# Patient Record
Sex: FEMALE | Race: WHITE | NOT HISPANIC OR LATINO | Employment: FULL TIME | URBAN - METROPOLITAN AREA
[De-identification: names, ages, dates, MRNs, and addresses within clinical notes are randomized per-mention and may not be internally consistent; named-entity substitution may affect disease eponyms.]

---

## 2023-10-05 ENCOUNTER — OFFICE VISIT (OUTPATIENT)
Dept: PRIMARY CARE | Facility: CLINIC | Age: 38
End: 2023-10-05
Payer: COMMERCIAL

## 2023-10-05 VITALS
WEIGHT: 158 LBS | OXYGEN SATURATION: 97 % | BODY MASS INDEX: 27.99 KG/M2 | DIASTOLIC BLOOD PRESSURE: 70 MMHG | SYSTOLIC BLOOD PRESSURE: 130 MMHG | TEMPERATURE: 97.1 F | HEART RATE: 67 BPM

## 2023-10-05 DIAGNOSIS — R53.83 OTHER FATIGUE: Primary | ICD-10-CM

## 2023-10-05 DIAGNOSIS — M25.552 LEFT HIP PAIN: ICD-10-CM

## 2023-10-05 PROCEDURE — 99213 OFFICE O/P EST LOW 20 MIN: CPT | Performed by: FAMILY MEDICINE

## 2023-10-05 PROCEDURE — 1036F TOBACCO NON-USER: CPT | Performed by: FAMILY MEDICINE

## 2023-10-05 ASSESSMENT — ENCOUNTER SYMPTOMS
ARTHRALGIAS: 1
ABDOMINAL PAIN: 0
FATIGUE: 1
SHORTNESS OF BREATH: 0

## 2023-10-05 NOTE — PROGRESS NOTES
Assessment/Plan   ASSESSMENT/PLAN:      Linda was seen today for disacuss post partum.  Diagnoses and all orders for this visit:  Other fatigue (Primary)  -     Tsh With Reflex To Free T4 If Abnormal; Future  -     CBC; Future  -     Lipid panel; Future  -     Comprehensive metabolic panel; Future  -     Vitamin D 25-Hydroxy,Total (for eval of Vitamin D levels); Future  -     Vitamin B12; Future  -     Hemoglobin A1c; Future  Left hip pain  -     XR hip left 2 or 3 views; Future  -     Referral to Physical Therapy; Future       Patient Instructions   Fatigue: will order screening labs   Hip pain: xray ordered, although fracture unlikely will rule out arthritis. Overall, likely musculoskeletal, provided hip stretches and PT referral     Paul Khalil DO     FUTURE DIRECTION:     Subjective   SUBJECTIVE:     Patient ID: Linda Montenegro is a 38 y.o. femalefor the following:    Joint pain   - located in hips and low back pain   - left worse than right, feels stiffness with internal rotation   - descrbed as achy, radiating down to knee   - states that she is 9 month postpartum for  (currently nursing) and felt that back pain on left side was worse during delivery     Fatigue   - Feels like she has not been able to get into a sleep routine between work and taking care of son   - She denies insomnia and mentions increased caffeine use   - Pt also mentions hair shedding which she attributes to recent pregnancy        Review of Systems   Constitutional:  Positive for fatigue.   Respiratory:  Negative for shortness of breath.    Cardiovascular:  Negative for chest pain.   Gastrointestinal:  Negative for abdominal pain.   Musculoskeletal:  Positive for arthralgias.       No Known Allergies    No current outpatient medications on file.     There is no problem list on file for this patient.      Social History     Socioeconomic History    Marital status:      Spouse name: Not on file    Number of children:  Not on file    Years of education: Not on file    Highest education level: Not on file   Occupational History    Not on file   Tobacco Use    Smoking status: Never    Smokeless tobacco: Never   Substance and Sexual Activity    Alcohol use: Not Currently     Comment: occasionally    Drug use: Never    Sexual activity: Not on file   Other Topics Concern    Not on file   Social History Narrative    Not on file     Social Determinants of Health     Financial Resource Strain: Not on file   Food Insecurity: Not on file   Transportation Needs: Not on file   Physical Activity: Not on file   Stress: Not on file   Social Connections: Not on file   Intimate Partner Violence: Not on file   Housing Stability: Not on file       Objective   OBJECTIVE:     Vitals:    10/05/23 1408   BP: 130/70   Pulse: 67   Temp: 36.2 °C (97.1 °F)   SpO2: 97%       Exam      Physical Exam  Constitutional:       Appearance: Normal appearance.   HENT:      Head: Normocephalic.   Cardiovascular:      Rate and Rhythm: Normal rate and regular rhythm.   Pulmonary:      Effort: Pulmonary effort is normal.      Breath sounds: No wheezing.   Musculoskeletal:      Cervical back: Normal range of motion.      Comments: Decreased ROM  with left internal rotation, - FALLON and -FADIR   Neurological:      Mental Status: She is alert.   Psychiatric:         Mood and Affect: Mood normal.

## 2023-10-05 NOTE — PATIENT INSTRUCTIONS
Fatigue: will order screening labs   Hip pain: xray ordered, although fracture unlikely will rule out arthritis. Overall, likely musculoskeletal, provided hip stretches and PT referral

## 2023-11-22 ENCOUNTER — LAB (OUTPATIENT)
Dept: LAB | Facility: LAB | Age: 38
End: 2023-11-22
Payer: COMMERCIAL

## 2023-11-22 ENCOUNTER — ANCILLARY PROCEDURE (OUTPATIENT)
Dept: RADIOLOGY | Facility: CLINIC | Age: 38
End: 2023-11-22
Payer: COMMERCIAL

## 2023-11-22 DIAGNOSIS — M25.552 LEFT HIP PAIN: ICD-10-CM

## 2023-11-22 DIAGNOSIS — R53.83 OTHER FATIGUE: ICD-10-CM

## 2023-11-22 LAB
25(OH)D3 SERPL-MCNC: 20 NG/ML (ref 30–100)
ALBUMIN SERPL BCP-MCNC: 4.6 G/DL (ref 3.4–5)
ALP SERPL-CCNC: 94 U/L (ref 33–110)
ALT SERPL W P-5'-P-CCNC: 16 U/L (ref 7–45)
ANION GAP SERPL CALC-SCNC: 10 MMOL/L (ref 10–20)
AST SERPL W P-5'-P-CCNC: 17 U/L (ref 9–39)
BILIRUB SERPL-MCNC: 0.4 MG/DL (ref 0–1.2)
BUN SERPL-MCNC: 16 MG/DL (ref 6–23)
CALCIUM SERPL-MCNC: 9.2 MG/DL (ref 8.6–10.3)
CHLORIDE SERPL-SCNC: 104 MMOL/L (ref 98–107)
CHOLEST SERPL-MCNC: 186 MG/DL (ref 0–199)
CHOLESTEROL/HDL RATIO: 2.8
CO2 SERPL-SCNC: 27 MMOL/L (ref 21–32)
CREAT SERPL-MCNC: 0.69 MG/DL (ref 0.5–1.05)
ERYTHROCYTE [DISTWIDTH] IN BLOOD BY AUTOMATED COUNT: 12.7 % (ref 11.5–14.5)
EST. AVERAGE GLUCOSE BLD GHB EST-MCNC: 111 MG/DL
GFR SERPL CREATININE-BSD FRML MDRD: >90 ML/MIN/1.73M*2
GLUCOSE SERPL-MCNC: 87 MG/DL (ref 74–99)
HBA1C MFR BLD: 5.5 %
HCT VFR BLD AUTO: 45.6 % (ref 36–46)
HDLC SERPL-MCNC: 66.6 MG/DL
HGB BLD-MCNC: 14.6 G/DL (ref 12–16)
LDLC SERPL CALC-MCNC: 110 MG/DL
MCH RBC QN AUTO: 28.1 PG (ref 26–34)
MCHC RBC AUTO-ENTMCNC: 32 G/DL (ref 32–36)
MCV RBC AUTO: 88 FL (ref 80–100)
NON HDL CHOLESTEROL: 119 MG/DL (ref 0–149)
NRBC BLD-RTO: 0 /100 WBCS (ref 0–0)
PLATELET # BLD AUTO: 226 X10*3/UL (ref 150–450)
POTASSIUM SERPL-SCNC: 4.2 MMOL/L (ref 3.5–5.3)
PROT SERPL-MCNC: 6.9 G/DL (ref 6.4–8.2)
RBC # BLD AUTO: 5.2 X10*6/UL (ref 4–5.2)
SODIUM SERPL-SCNC: 137 MMOL/L (ref 136–145)
TRIGL SERPL-MCNC: 49 MG/DL (ref 0–149)
TSH SERPL-ACNC: 1.2 MIU/L (ref 0.44–3.98)
VIT B12 SERPL-MCNC: 218 PG/ML (ref 211–911)
VLDL: 10 MG/DL (ref 0–40)
WBC # BLD AUTO: 6.2 X10*3/UL (ref 4.4–11.3)

## 2023-11-22 PROCEDURE — 80061 LIPID PANEL: CPT

## 2023-11-22 PROCEDURE — 84443 ASSAY THYROID STIM HORMONE: CPT

## 2023-11-22 PROCEDURE — 36415 COLL VENOUS BLD VENIPUNCTURE: CPT

## 2023-11-22 PROCEDURE — 73502 X-RAY EXAM HIP UNI 2-3 VIEWS: CPT | Mod: LT

## 2023-11-22 PROCEDURE — 73502 X-RAY EXAM HIP UNI 2-3 VIEWS: CPT | Mod: LEFT SIDE | Performed by: RADIOLOGY

## 2023-11-22 PROCEDURE — 80053 COMPREHEN METABOLIC PANEL: CPT

## 2023-11-22 PROCEDURE — 82306 VITAMIN D 25 HYDROXY: CPT

## 2023-11-22 PROCEDURE — 82607 VITAMIN B-12: CPT

## 2023-11-22 PROCEDURE — 83036 HEMOGLOBIN GLYCOSYLATED A1C: CPT

## 2023-11-22 PROCEDURE — 85027 COMPLETE CBC AUTOMATED: CPT

## 2023-11-28 ENCOUNTER — TELEPHONE (OUTPATIENT)
Dept: PRIMARY CARE | Facility: CLINIC | Age: 38
End: 2023-11-28
Payer: COMMERCIAL

## 2023-11-28 NOTE — TELEPHONE ENCOUNTER
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 Continue with PT

## 2023-11-29 ENCOUNTER — PATIENT MESSAGE (OUTPATIENT)
Dept: PRIMARY CARE | Facility: CLINIC | Age: 38
End: 2023-11-29
Payer: COMMERCIAL

## 2023-11-29 DIAGNOSIS — M62.89 PELVIC FLOOR DYSFUNCTION: Primary | ICD-10-CM

## 2023-12-28 ENCOUNTER — EVALUATION (OUTPATIENT)
Dept: PHYSICAL THERAPY | Facility: CLINIC | Age: 38
End: 2023-12-28
Payer: COMMERCIAL

## 2023-12-28 DIAGNOSIS — M25.552 LEFT HIP PAIN: Primary | ICD-10-CM

## 2023-12-28 PROCEDURE — 97110 THERAPEUTIC EXERCISES: CPT | Mod: GP

## 2023-12-28 PROCEDURE — 97161 PT EVAL LOW COMPLEX 20 MIN: CPT | Mod: GP

## 2023-12-28 ASSESSMENT — ENCOUNTER SYMPTOMS
OCCASIONAL FEELINGS OF UNSTEADINESS: 0
LOSS OF SENSATION IN FEET: 0
DEPRESSION: 0

## 2023-12-28 ASSESSMENT — PATIENT HEALTH QUESTIONNAIRE - PHQ9
SUM OF ALL RESPONSES TO PHQ9 QUESTIONS 1 AND 2: 0
2. FEELING DOWN, DEPRESSED OR HOPELESS: NOT AT ALL
1. LITTLE INTEREST OR PLEASURE IN DOING THINGS: NOT AT ALL

## 2023-12-28 ASSESSMENT — PAIN - FUNCTIONAL ASSESSMENT: PAIN_FUNCTIONAL_ASSESSMENT: 0-10

## 2023-12-28 ASSESSMENT — PAIN SCALES - GENERAL: PAINLEVEL_OUTOF10: 0 - NO PAIN

## 2023-12-28 NOTE — PROGRESS NOTES
Physical Therapy    Physical Therapy Evaluation and Treatment      Patient Name: Linda Montenegro  MRN: [id number]  Today's Date: 12/28/2023  Time Calculation  Start Time: 0730  Stop Time: 0825  Time Calculation (min): 55 min    Assessment:  PT Assessment  Rehab Prognosis: Excellent   The patient is a 38 year old female presenting to PT with c/o LBP, L hip tightness, & L hip pain.  The patient will benefit from skilled intervention to address above complaints & to increase L LE strength for return to previous activity level.      Plan:  OP PT Plan  Treatment/Interventions: Cryotherapy, Education/ Instruction, Hot pack, Therapeutic exercises  PT Plan: Skilled PT  PT Frequency: 2 times per week  Duration: 3 weeks  Rehab Potential: Excellent  Plan of Care Agreement: Patient    Current Problem:   1. Left hip pain  Referral to Physical Therapy    Follow Up In Physical Therapy          Subjective    General:  General  Reason for Referral: L hip pain  Referred By: Remi PARKER  Past Medical History Relevant to Rehab: Reviewed  The patient reports she has been experiencing LBP/L hip pain for the past year after childbirth.  Sitting for an extended period of time & getting up from the floor exacerbates pain.  Pain ranges from 0-5/10.  The patient's goals are to run again & get up from the floor without pain.      Precautions:  Precautions  STEADI Fall Risk Score (The score of 4 or more indicates an increased risk of falling): 0  Precautions Comment: none     Pain:  Pain Assessment  Pain Assessment: 0-10  Pain Score: 0 - No pain    Prior Level of Function:  Prior Function Per Pt/Caregiver Report  Level of [place]: Independent with ADLs and functional transfers    Objective   L Hip AROM (degrees)  Flexion = 95   Abduction = 50  Extension = 34    L LE strength  Hip Flexion = 4+/5  Hip Abduction = 4/5  Hip Extension = 4/5  Quads = 4+/5  HS = 4+/5    L HS flexibility = 75 degrees     Outcome Measures:  Other Measures  Lower  "Extremity Funtional Score (LEFS): 64     Treatments:  Seated HS stretch - x3 30\"H  Kneeling Hip Flexor stretch - x3 30\"H  Standing Hip Adductor stretch - x3 30\"H  Supine PPT - x10 5\"H  SL Bridge - x5  SL Hip Abduction - x10 5\"H  HP low back & L hip    EDUCATION:   HEP    Goals:  1) Decrease L hip pain & LBP with all activities <1/10    2) Increase L LE strength to 5/5 to prepare for return to running           "

## 2024-01-04 ENCOUNTER — TREATMENT (OUTPATIENT)
Dept: PHYSICAL THERAPY | Facility: CLINIC | Age: 39
End: 2024-01-04
Payer: COMMERCIAL

## 2024-01-04 DIAGNOSIS — M25.552 LEFT HIP PAIN: ICD-10-CM

## 2024-01-04 PROCEDURE — 97110 THERAPEUTIC EXERCISES: CPT | Mod: GP

## 2024-01-04 ASSESSMENT — PAIN - FUNCTIONAL ASSESSMENT: PAIN_FUNCTIONAL_ASSESSMENT: 0-10

## 2024-01-04 ASSESSMENT — PAIN SCALES - GENERAL: PAINLEVEL_OUTOF10: 0 - NO PAIN

## 2024-01-04 NOTE — PROGRESS NOTES
"Physical Therapy    Physical Therapy Treatment      Patient Name: Linda Montenegro  MRN: [id number]  Today's Date: 1/4/2024  Time Calculation  Start Time: 0815  Stop Time: 0910  Time Calculation (min): 55 min    Assessment:    The patient reports experiencing muscle soreness & fatigue following treatment but no significant low back or hip pain.    Plan: Continue with POC to increase trunk strength & hip strength for return to previous activity level.       Current Problem:   1. Left hip pain  Follow Up In Physical Therapy          Subjective    General:   The patient reports she is pain free this morning.    Precautions:  Precautions  Precautions Comment: none     Pain:  Pain Assessment  Pain Assessment: 0-10  Pain Score: 0 - No pain     Objective   L LE strength  Hip Flexion = 4+/5  Hip Abduction = 4/5  Hip Extension = 4/5  Quads = 4+/5  HS = 4+/5      Treatments:  EXERCISES Date 1/4/24 Date Date Date    REPS REPS REPS REPS          Nustep L5 5'             Shuttle   DLP       Shuttle   SLP              Shuttle   TR/HR                     Tband   Lat Pulls Blue x 20      Tband   Skis Blue x 20      Tband   Mid Row Blue x 20      Tband   Mower starts              Back Extension 60# 3x10      SB   LTR              4 Way Hip Flexion 30# 2x10      4 Way Hip Abduction 30# 2x10      4 Way Hip Extension  30# 2x10             Seated Swiss Ball Alt Hip Flex  X10 ea 5\" H      Swiss Ball seated 3 way stretch  X5 ea 5\" H      PPT with marches              HP L hip 10'      Stretches              Ab Curls 50# 3x10                   Goals:  1) Decrease L hip pain & LBP with all activities <1/10 -progressing    2) Increase L LE strength to 5/5 to prepare for return to running         "

## 2024-01-08 ENCOUNTER — TREATMENT (OUTPATIENT)
Dept: PHYSICAL THERAPY | Facility: CLINIC | Age: 39
End: 2024-01-08
Payer: COMMERCIAL

## 2024-01-08 DIAGNOSIS — M25.552 LEFT HIP PAIN: ICD-10-CM

## 2024-01-08 PROCEDURE — 97110 THERAPEUTIC EXERCISES: CPT | Mod: GP

## 2024-01-08 ASSESSMENT — PAIN - FUNCTIONAL ASSESSMENT: PAIN_FUNCTIONAL_ASSESSMENT: 0-10

## 2024-01-08 ASSESSMENT — PAIN SCALES - GENERAL: PAINLEVEL_OUTOF10: 0 - NO PAIN

## 2024-01-08 NOTE — PROGRESS NOTES
"Physical Therapy    Physical Therapy Treatment      Patient Name: Linda Montenegro  MRN: [id number]  Today's Date: 1/8/2024  Time In: 1745  Time Out: 1835  Total Time: 50 minutes     Assessment:    The patient reports experiencing muscle fatigue & soreness following treatment but no increase in pain.      Plan: Continue with POC to increase trunk strength & hip strength for return to previous activity level.       Current Problem:   1. Left hip pain  Follow Up In Physical Therapy          Subjective    General:   The patient reports she is pain free this evening.    Precautions:   None     Pain:   0/10     Objective   L LE strength  Hip Flexion = 4+/5      Treatments:  EXERCISES Date 1/4/24 Date 1/8/24 Date Date    REPS REPS REPS REPS          Nustep L5 5'             Shuttle   DLP       Shuttle   SLP              Shuttle   TR/HR       Birddogs  X10 ea 5\"H            Tband   Lat Pulls Blue x 20 Blue x20     Tband   Skis Blue x 20 Blue x20     Tband   Mid Row Blue x 20 Blue x20     Tband   Mower starts              Back Extension 60# 3x10 60# 3x15     SB   LTR              4 Way Hip Flexion 30# 2x10 30# 2x10     4 Way Hip Abduction 30# 2x10 30# 2x10     4 Way Hip Extension  30# 2x10 30# 2x10            Seated Swiss Ball Alt Hip Flex  X10 ea 5\" H X10 ea 5\"H     Swiss Ball seated 3 way stretch  X5 ea 5\" H X5 ea 5\"H     PPT with marches              HP L hip 10' 10'     Stretches              Ab Curls 50# 3x10 60# 3x15     Band Walks   Stuart 20' 1ea           Goals:  1) Decrease L hip pain & LBP with all activities <1/10 -progressing    2) Increase L LE strength to 5/5 to prepare for return to running       "

## 2024-01-11 ENCOUNTER — TREATMENT (OUTPATIENT)
Dept: PHYSICAL THERAPY | Facility: CLINIC | Age: 39
End: 2024-01-11
Payer: COMMERCIAL

## 2024-01-11 DIAGNOSIS — M25.552 LEFT HIP PAIN: Primary | ICD-10-CM

## 2024-01-11 PROCEDURE — 97110 THERAPEUTIC EXERCISES: CPT | Mod: GP

## 2024-01-11 ASSESSMENT — PAIN SCALES - GENERAL: PAINLEVEL_OUTOF10: 1

## 2024-01-11 ASSESSMENT — PAIN - FUNCTIONAL ASSESSMENT: PAIN_FUNCTIONAL_ASSESSMENT: 0-10

## 2024-01-11 NOTE — PROGRESS NOTES
"Physical Therapy    Physical Therapy Treatment      Patient Name: Linda Montenegro  MRN: [id number]  Today's Date: 1/8/2024  Time Calculation  Start Time: 0730  Stop Time: 0825  Time Calculation (min): 55 min  Visit #4    Assessment:    The patient reports experiencing L lateral hip/quad \"popping\" with resisted L hip abduction.      Plan: Continue with POC to increase trunk strength & hip strength for return to previous activity level.       Current Problem:   1. Left hip pain  Follow Up In Physical Therapy          Subjective    General:   The patient reports experiencing peak muscle soreness 2 days following last treatment.      Precautions:  Precautions  Precautions Comment: none     Pain:  Pain Assessment  Pain Assessment: 0-10  Pain Score: 1  Pain Location: Hip  Pain Orientation: Left     Objective   L LE strength  Hip Flexion = 4+/5      Treatments:  EXERCISES Date 1/4/24 Date 1/8/24 Date 1/11/24 Date    REPS REPS REPS REPS          Nustep L5 5'  L5 5'           Shuttle   DLP       Shuttle   SLP              Shuttle   TR/HR       Birddogs  X10 ea 5\"H X10 ea 5\"H           Tband   Lat Pulls Blue x 20 Blue x20 Blue x20    Tband   Skis Blue x 20 Blue x20 Blue x20    Tband   Mid Row Blue x 20 Blue x20 Blue x20    Tband   Mower starts              Back Extension 60# 3x10 60# 3x15 60# 3x15    SB   LTR              4 Way Hip Flexion 30# 2x10 30# 2x10 30# 2x10    4 Way Hip Abduction 30# 2x10 30# 2x10 30# 2x10    4 Way Hip Extension  30# 2x10 30# 2x10 30# 2x10           Seated Swiss Ball Alt Hip Flex  X10 ea 5\" H X10 ea 5\"H X10 ea 5\"H    Swiss Ball seated 3 way stretch  X5 ea 5\" H X5 ea 5\"H X5 ea 5\"H    PPT with marches              HP L hip 10' 10' 10'    Stretches              Ab Curls 50# 3x10 60# 3x15 60# 3x15    Band Walks   Stuart 20' 1ea           Goals:  1) Decrease L hip pain & LBP with all activities <1/10 -progressing    2) Increase L LE strength to 5/5 to prepare for return to running     "

## 2024-01-15 ENCOUNTER — TREATMENT (OUTPATIENT)
Dept: PHYSICAL THERAPY | Facility: CLINIC | Age: 39
End: 2024-01-15
Payer: COMMERCIAL

## 2024-01-15 DIAGNOSIS — M25.552 LEFT HIP PAIN: ICD-10-CM

## 2024-01-15 PROCEDURE — 97530 THERAPEUTIC ACTIVITIES: CPT | Mod: GP | Performed by: PHYSICAL THERAPIST

## 2024-01-15 PROCEDURE — 97110 THERAPEUTIC EXERCISES: CPT | Mod: GP | Performed by: PHYSICAL THERAPIST

## 2024-01-15 ASSESSMENT — PAIN - FUNCTIONAL ASSESSMENT: PAIN_FUNCTIONAL_ASSESSMENT: 0-10

## 2024-01-15 ASSESSMENT — PAIN SCALES - GENERAL: PAINLEVEL_OUTOF10: 0 - NO PAIN

## 2024-01-15 NOTE — PROGRESS NOTES
"Physical Therapy    Physical Therapy Treatment    Patient Name: Linda Montenegro  MRN: [id number]  : 1985   Today's Date: 1/15/2024  Time Calculation  Start Time:   Stop Time: 0  Time Calculation (min): 45 min  Visit Number: 5  Auth: 6 v?    Current Problem  Problem List Items Addressed This Visit             ICD-10-CM    Left hip pain M25.552        Subjective   Patient reports that she has been recovering from the band walk exercise, and currently is a lot better than she was before. That exercise has been the only one that has aggravated her hip.     Precautions  Precautions  Precautions Comment: None    Pain  Pain Assessment: 0-10  Pain Score: 0 - No pain  Pain Location: Hip  Pain Orientation: Left    Objective   Right LLD (~1/4 in) - may indicate left innominate posteriorly rotated      LE Right Left   Prone Hip IR 5/5 4/5   Prone Hip ER / 5/5   Hip Flexion / 5/5   Knee Extension  5/5   DF 5/5 4/5   EV / 4/5   Gr Toe Ext /5      Lumbar ROM WNL with minimal to no pain indicators.    Treatments:  EXERCISES 1/4/24 1/8/24 1/11/24 1/15/2024   Visit #        REPS REPS REPS REPS          Nustep L5 5'  L5 5' NT          Shuttle   DLP       Shuttle   SLP              Shuttle   TR/HR       Birddogs  X10 ea 5\"H X10 ea 5\"H NT          Tband   Lat Pulls Blue x 20 Blue x20 Blue x20 NT   Tband   Skis Blue x 20 Blue x20 Blue x20 NT   Tband   Mid Row Blue x 20 Blue x20 Blue x20 NT   Tband   Mower starts              Back Extension 60# 3x10 60# 3x15 60# 3x15 NT   SB   LTR              4 Way Hip Flexion 30# 2x10 30# 2x10 30# 2x10 NT   4 Way Hip Abduction 30# 2x10 30# 2x10 30# 2x10 NT   4 Way Hip Extension  30# 2x10 30# 2x10 30# 2x10 NT          Seated Swiss Ball Alt Hip Flex  X10 ea 5\" H X10 ea 5\"H X10 ea 5\"H NT   Swiss Ball seated 3 way stretch  X5 ea 5\" H X5 ea 5\"H X5 ea 5\"H NT   PPT with marches              HP L hip 10' 10' 10' Reviewed   Stretches              Ab Curls 50# 3x10 60# 3x15 60# 3x15 NT " "  Band Walks   Stuart 20' 1ea  NT          Muscle Energy (left flex, right extend) Iso    5\" x 5 (2)   Hip ADD/ABD Iso    2\" x 2 ea          Standing Lumbar Extension    X10 (no sx change)          Mod. Plank on EOB    15\"   Bracing + pelvic floor    5\" x 10   PPT    x10   + Alt March    X10 ea   + Alt Oblique crossover iso    5\" x 10          HEP    See below     Access Code: [email or web address]  URL: [email or web address]  Date: 01/15/2024  Prepared by: Justin Gonsalez    Exercises  - Supine SI Joint Self-Correction  - 2 x daily - 2 sets - 5 reps - 5 sec hold  - Supine Hip Adduction Isometric with Ball  - 2 x daily - 2 sets - 2 reps - 2 sec hold  - Hooklying Isometric Hip Abduction with Belt  - 2 x daily - 2 sets - 2 reps - 2 sec hold  - Plank with Elbows on Table  - 1 x daily - 2 reps - 15 sec hold  - Supine Transversus Abdominis Bracing - Hands on Ground  - 1 x daily - 10 reps - 5 sec hold  - Supine March  - 1 x daily - 10 reps - 1 sec hold  - Hooklying Isometric Hip Flexion with Opposite Arm  - 1 x daily - 10 reps - 3 sec hold    Therapeutic exercise  25'    Therapeutic Activities:  Measurements, application of posture and positioning with work and home life - 20'     Assessment:   Patient presented today with clinical indicators of left SI joint dysfunction and instability that responded well to muscle energy and core stabilization. DF, EV, gr. Toe Ext, and prone hip IR MMT all improved with muscle energy. Lumbar extension did not change these tests after SI joint was stabilized.     Plan:   Assess response to SI joint stabilization with muscle energy and core stabilization. Consult Pelvic Floor specialized PT.     Patient to cancel appt with David on 1/18 (per Rhiannon's recommendation) and consult Rhiannon, then reassess with David next week. Patient plans to cancel via [facility].    Goals:  1) Decrease L hip pain & LBP with all activities <1/10 -progressing    2) Increase L LE strength to 5/5 to prepare " for return to running

## 2024-01-18 ENCOUNTER — APPOINTMENT (OUTPATIENT)
Dept: PHYSICAL THERAPY | Facility: CLINIC | Age: 39
End: 2024-01-18
Payer: COMMERCIAL

## 2024-01-22 ENCOUNTER — TREATMENT (OUTPATIENT)
Dept: PHYSICAL THERAPY | Facility: CLINIC | Age: 39
End: 2024-01-22
Payer: COMMERCIAL

## 2024-01-22 DIAGNOSIS — M25.552 LEFT HIP PAIN: Primary | ICD-10-CM

## 2024-01-22 PROCEDURE — 97110 THERAPEUTIC EXERCISES: CPT | Mod: GP

## 2024-01-22 ASSESSMENT — PAIN SCALES - GENERAL: PAINLEVEL_OUTOF10: 0 - NO PAIN

## 2024-01-22 ASSESSMENT — PAIN - FUNCTIONAL ASSESSMENT: PAIN_FUNCTIONAL_ASSESSMENT: 0-10

## 2024-01-22 NOTE — PROGRESS NOTES
"Physical Therapy    Physical Therapy Treatment (reassessment/discharge)    Patient Name: Linda Montenegro  MRN: [id number]  : 1985   Today's Date: 2024  Time Calculation  Start Time:   Stop Time:   Time Calculation (min): 50 min  Visit Number: 6  Auth: 6     Current Problem  Left hip pain M25.552     Subjective   The patient reports experiencing LBP following last treatment.  The patient states she is pain free today.      Precautions  Precautions  Precautions Comment: none    Pain  Pain Assessment: 0-10  Pain Score: 0 - No pain    Objective    L LE strength  Hip Flexion = 5/5  Hip Abduction = 4+/5  Hip Extension = 5/5  Quads = 5/5  HS = 5/5    LEFS = 73    Treatments:  EXERCISES 24   Visit #        REPS REPS REPS REPS          Nustep L5 5'  L5 5' NT          Shuttle   DLP       Shuttle   SLP              Shuttle   TR/HR       Birddogs  X10 ea 5\"H X10 ea 5\"H NT          Tband   Lat Pulls Blue x 20 Blue x20 Blue x20 NT   Tband   Skis Blue x 20 Blue x20 Blue x20 NT   Tband   Mid Row Blue x 20 Blue x20 Blue x20 NT   Tband   Mower starts              Back Extension 60# 3x10 60# 3x15 60# 3x15 NT   SB   LTR              4 Way Hip Flexion 30# 2x10 30# 2x10 30# 2x10 NT   4 Way Hip Abduction 30# 2x10 30# 2x10 30# 2x10 NT   4 Way Hip Extension  30# 2x10 30# 2x10 30# 2x10 NT          Seated Swiss Ball Alt Hip Flex  X10 ea 5\" H X10 ea 5\"H X10 ea 5\"H NT   Swiss Ball seated 3 way stretch  X5 ea 5\" H X5 ea 5\"H X5 ea 5\"H NT   PPT with               HP L hip 10' 10' 10' Reviewed   Stretches              Ab Curls 50# 3x10 60# 3x15 60# 3x15 NT   Band Walks   Stuart 20' 1ea  NT          Muscle Energy (left flex, right extend) Iso    5\" x 5 (2)   Hip ADD/ABD Iso    2\" x 2 ea          Standing Lumbar Extension    X10 (no sx change)          Mod. Plank on EOB    15\"   Bracing + pelvic floor    5\" x 10   PPT    x10   + Alt March    X10 ea   + Alt Oblique crossover iso    5\" x 10        "   HEP    See below     EXERCISES Date 1/22/24 Date Date Date    REPS REPS REPS REPS          Nustep       Birddogs              Pulldowns Blue x20      Rows Blue x20      Skis Blue x20             Qhip Flexion 30# 2x10      Qhip Extension 30# 2x10      Qhip Abduction 30# 2x10      Qhip  TKE              Back Extension 60# 3x15      Ab Curls 60# 3x15             Seated swiss ball alt A/L       Seated swiss ball 3-way stretch              HP L hip/low back 10'              Discharge 10'                                                          Assessment:   The patient will continue to perform HEP at this time.      Plan:   Discharge from therapy today.  Thank you for your referral.      Goals:  1) Decrease L hip pain & LBP with all activities <1/10 -partially met    2) Increase L LE strength to 5/5 to prepare for return to running -partially met

## 2024-02-15 ENCOUNTER — APPOINTMENT (OUTPATIENT)
Dept: PHYSICAL THERAPY | Facility: CLINIC | Age: 39
End: 2024-02-15
Payer: COMMERCIAL

## 2024-03-21 ENCOUNTER — EVALUATION (OUTPATIENT)
Dept: PHYSICAL THERAPY | Facility: CLINIC | Age: 39
End: 2024-03-21
Payer: COMMERCIAL

## 2024-03-21 DIAGNOSIS — M62.89 PELVIC FLOOR DYSFUNCTION: Primary | ICD-10-CM

## 2024-03-21 PROCEDURE — 97535 SELF CARE MNGMENT TRAINING: CPT | Mod: GP

## 2024-03-21 PROCEDURE — 97161 PT EVAL LOW COMPLEX 20 MIN: CPT | Mod: GP

## 2024-03-21 PROCEDURE — 97110 THERAPEUTIC EXERCISES: CPT | Mod: GP

## 2024-03-21 ASSESSMENT — ENCOUNTER SYMPTOMS
LOSS OF SENSATION IN FEET: 0
OCCASIONAL FEELINGS OF UNSTEADINESS: 0
DEPRESSION: 0

## 2024-03-21 ASSESSMENT — PAIN - FUNCTIONAL ASSESSMENT: PAIN_FUNCTIONAL_ASSESSMENT: 0-10

## 2024-03-21 ASSESSMENT — PATIENT HEALTH QUESTIONNAIRE - PHQ9
1. LITTLE INTEREST OR PLEASURE IN DOING THINGS: NOT AT ALL
SUM OF ALL RESPONSES TO PHQ9 QUESTIONS 1 AND 2: 0
2. FEELING DOWN, DEPRESSED OR HOPELESS: NOT AT ALL

## 2024-03-21 ASSESSMENT — PAIN SCALES - GENERAL: PAINLEVEL_OUTOF10: 0 - NO PAIN

## 2024-03-21 NOTE — PROGRESS NOTES
Physical Therapy    EVALUATION AND TREATMENT    Name: Linda Montenegro  MRN: [id number]  : 1985  Today's Date: 24     Time Calculation  Start Time: 1416  Stop Time: 1523  Time Calculation (min): 67 min    Assessment:    Pt presenting to the clinic 15 months post partum. She is describing left sided posterior pelvic girdle pain that can radiate into her hip and groin. She has previously been seen in PT for SI dysfunction with moderate improvement in symptoms. She has minimal other notable PF history except for occasional pain with intercourse.  There are trigger points noted vaginally that refer into her abdomen on the left side with + pelvic obliquity. She could benefit from a short course of PT to address these impairments.     Insurance:  Insurance Type: [employer] [place] [employer] [place]  Visit number: IE   Approved # of visits 30 visits, $15 copay  Authorization Needed: no  Cert Date Ends On: n/a    Plan:      Planned interventions include: biofeedback, cryotherapy, education/instruction, electrical stimulation, gait training, home program, hot pack, kinesiotaping, manual therapy, neuromuscular re-education, self care/home management, therapeutic activities, and therapeutic exercises.   Access Code: [email or web address]  URL: [email or web address]  Date: 2024  Prepared by: Rhiannon Banerjee    Exercises  - Clamshell  - 1 x daily - 7 x weekly - 2 sets - 10 reps - 5 seconds  hold  - Sidelying Reverse Clamshell  - 1 x daily - 7 x weekly - 2 sets - 10 reps - 5 seconds  hold  - Prone Hip Extension  - 1 x daily - 7 x weekly - 2 sets - 10 reps - 5 seconds  hold    Current Problem:  1. Pelvic floor dysfunction  Referral to Physical Therapy    Follow Up In Physical Therapy          Subjective   General  Reason for Referral: Post partum care  Referred By: Dr. Villeda  3 vaginal births - 11, 9, 15 months (last delivery at 38 weeks)  She reports minimal tearing. She delivered on her back.  There is no plan for  future children. No birth control.   Currently still breast feeding at night time, but currently trying to wean.   Complaining of L low and back pain. Starting in the SI joint and radiating into left hip and groin. Thinks it may be more from   Recently started increasing physical activity and elliptical. She feels like her symptoms are getting better.     Bladder  Leakage - intermittent with coughing, no leakage with exercise  No pad use   Frequency - 3-4 day time voids   Frequency - 0 night time voids   Urgency - with coughing  Complete emptying     Ob Gyn:  Occasional pain with insertion  No pain with tampons, speculum  No birth control  Feels different post children   + ex lap for potential ectopic pregnancy     Bowel:  Alternate between constipation and diarrhea   [place] ranges 2-3 with incomplete emptying   Intermittent bright red stool with painful bowel movement    Precautions  Precautions Comment: none     Pain  Pain Assessment: 0-10  Precautions  PMH: not contributory  Fall Risk: no    Objective   OBJECTIVE  External and internal assessment explained. Verbal consent obtained to proceed with vaginal exam.    Vaginal Observation:  Appropriate tissue erythema   Slightly decreased introitus size   - curl up  - descent with cough  No pain or reproduction of symptoms with visceral mobility     Vaginal palpation external:   1 o'clock (ischiocavernosus)  2 o'clock (bulbocavernosus)  3 o'clock (superficial transverse perineal)  4 o'clock (pubococcygeus)  5 o'clock (iliococcygeus)  6 o'clock (coccyx)  7 o'clock (iliococcygeus)  8 o'clock (pubococcygeus)  9 o'clock (superficial transverse perineal)  10 o'clock (bulbocavernosus)  11 o'clock (ischiocavernosus)  12 o'clock (pubic symphysis inferior angle)    Vaginal palpation internal:  1 o'clock (ischiocavernosus)  2 o'clock (bulbocavernosus)  3 o'clock (superficial transverse perineal)  4 o'clock (pubococcygeus)  5 o'clock (iliococcygeus) reproduces pain into L  abdomen  6 o'clock (coccyx)  7 o'clock (iliococcygeus)  8 o'clock (pubococcygeus)  9 o'clock (superficial transverse perineal)  10 o'clock (bulbocavernosus)  11 o'clock (ischiocavernosus)  12 o'clock (pubic symphysis inferior angle)    Pelvic Floor MMT Grade  0/zero: no palpable contraction/squeeze  1/trace: flicker of squeeze or contraction  2/poor: squeeze pressure asymmetrical or felt at various points- no lift or displacement  3/fair: squeeze pressure (contraction) and lift or displacement  4/good: squeeze pressure (contraction) and lift or displacement from anterior, posterior, and side walls  5/strong: full circumference of finger compressed, displaced with an inward pull  Reproduces abdominal discomfort     [place] PERF(Power/Endurance/Repetitions/Fast Twitch)  4/4/2/5    Long sit: anterior rotation L innominate   Pain at proximal adductor L > R     Outcome Measure:   NIH CPSI pain 3   NIH CPSI urinary 0  NIH CPSI quality of life 7    Careplan Goals:  1. Pt will be independent in HEP to maximize PT POC   2. Pt will improve NIH CPSI by >50% raw score  3. Pt will be able to improve worst pain severity on NPRS by >2 points MCID   4. Pt will be able to tolerate all vaginal medical exams symptom free   5. Pt will demonstrate appropriate vaginal contraction and relaxation needed for optimal length tension relationship     Daria Banerjee, PT

## 2024-03-28 ENCOUNTER — TREATMENT (OUTPATIENT)
Dept: PHYSICAL THERAPY | Facility: CLINIC | Age: 39
End: 2024-03-28
Payer: COMMERCIAL

## 2024-03-28 DIAGNOSIS — M62.89 PELVIC FLOOR DYSFUNCTION: ICD-10-CM

## 2024-03-28 PROCEDURE — 97110 THERAPEUTIC EXERCISES: CPT | Mod: GP

## 2024-03-28 NOTE — PROGRESS NOTES
PHYSICAL THERAPY   TREATMENT NOTE    Patient Name:  Linda Montenegro   Patient MRN: [id number]  Date: 03/28/24    Time Calculation  Start Time: 0903  Stop Time: 0953  Time Calculation (min): 50 min    Insurance:  Insurance Type: Medical [place] [employer] [place]  Visit number: 2  Approved # of visits 30 visits, $15 copay  Authorization Needed: no   Cert Date Ends On: n/a    General   Reason for visit: Pelvic floor dysfunction   Referred by: Dr. Villeda    Therapy diagnoses:   1. Pelvic floor dysfunction  Follow Up In Physical Therapy           Assessment:    Pt with moderate decreases in L hip IR AROM. Anticipate exercises were too high of intensity and repetitions for weak and tight hip IR.     Plan:  Will reduce repetitions of internal rotation with goal of titrating repetitions up. Email update regarding IR symptoms.     Subjective  Pt reports L sided abdominal/groin pain. Exercises caused L side hip pain, especially the reverse clamshell 4/10.  Pain (0-10): 0    Precautions  PMH: not contributory   Fall Risk: no    Objective  Increased L ES hypertrophy and lumbar lordosis   Hip IR L AROM 27  Hip IR R AROM 35     Treatment Performed:   Therapeutic Exercise:  50 minutes  Prone internal rotation isometric x 2 minutes  Sidelying hip abduction with small [place] 2 x 5   Child's pose 30 seconds   Quadruped rock 10 seconds with 10 second hold   Seated hip roll for control internal rotation   Supine LTR 10 second hold x 2 minutes   Seated lumbar twist 10 seconds R/L  Hooklying isometric hip abduction x 10 R/L    Self Care:   minutes    Manual Therapy:   minutes    Neuromuscular Re-education:   minutes    Gait Training:    minutes    Modalities: minutes

## 2024-04-18 ENCOUNTER — TREATMENT (OUTPATIENT)
Dept: PHYSICAL THERAPY | Facility: CLINIC | Age: 39
End: 2024-04-18
Payer: COMMERCIAL

## 2024-04-18 DIAGNOSIS — M62.89 PELVIC FLOOR DYSFUNCTION: ICD-10-CM

## 2024-04-18 PROCEDURE — 97110 THERAPEUTIC EXERCISES: CPT | Mod: GP

## 2024-04-18 NOTE — PROGRESS NOTES
PHYSICAL THERAPY   TREATMENT NOTE    Patient Name:  Linda Montenegro   Patient MRN: [id number]  Date: 04/18/24    Time Calculation  Start Time: 0816  Stop Time: 0858  Time Calculation (min): 42 min    Insurance:  Insurance Type: Medical [place] [employer] [place]  Visit number: 3  Approved # of visits 30 visits, $15 copay  Authorization Needed: no   Cert Date Ends On: n/a    General   Reason for visit: Pelvic floor dysfunction   Referred by: Dr. Villeda    Therapy diagnoses:   1. Pelvic floor dysfunction  Follow Up In Physical Therapy           Assessment:    Pt with moderate decreases in L hip IR AROM. Anticipate exercises were too high of intensity and repetitions for weak and tight hip IR.     Plan:  Will continue to focus on hip IR but also increase neuromuscular connection of TA   - Increase frequency of repetition in order to improve tonic contraction  - Avoid valsalva   - Reiterated sitting position with feet on stool     Subjective  Pain in her left hip 50/50 good and bad days. She is not too good about compliance with HEP.   She stopped breast feeding 3 weeks ago and noticed that she is getting bilateral SI joint pain.   Pain (0-10): 0    Precautions  PMH: not contributory   Fall Risk: no    Objective  Increased L ES hypertrophy and lumbar lordosis   Hip IR L AROM 27  Hip IR R AROM 35     Treatment Performed:   Therapeutic Exercise:  42 minutes  TA hooklying 2 x 10   TA seated on ball 2 x 10   TA quadruped 2 x 10   TA inhale lift 2 x 10   TA prone 2 x10  Mini squatwith focuson posterior weight shift, TA contraction  Standing TA contraction   Child's pose     Self Care:   minutes    Manual Therapy:   minutes    Neuromuscular Re-education:   minutes    Gait Training:    minutes    Modalities: minutes

## 2024-04-25 ENCOUNTER — TREATMENT (OUTPATIENT)
Dept: PHYSICAL THERAPY | Facility: CLINIC | Age: 39
End: 2024-04-25
Payer: COMMERCIAL

## 2024-04-25 DIAGNOSIS — M62.89 PELVIC FLOOR DYSFUNCTION: ICD-10-CM

## 2024-04-25 PROCEDURE — 97110 THERAPEUTIC EXERCISES: CPT | Mod: GP

## 2024-04-25 NOTE — PROGRESS NOTES
PHYSICAL THERAPY   TREATMENT NOTE    Patient Name:  Linda Montenegro   Patient MRN: [id number]  Date: 04/25/24    Time Calculation  Start Time: 0816  Stop Time: 0905  Time Calculation (min): 49 min    Insurance:  Insurance Type: Medical [place] [employer] [place]  Visit number: 3  Approved # of visits 30 visits, $15 copay  Authorization Needed: no   Cert Date Ends On: n/a    General   Reason for visit: Pelvic floor dysfunction   Referred by: Dr. Villeda    Therapy diagnoses:   1. Pelvic floor dysfunction  Follow Up In Physical Therapy           Assessment:    Pt with increased fatigue during longer hold exercises.     Plan:  - Increase frequency of repetition in order to improve tonic contraction  - Avoid valsalva   - Reiterated sitting position with feet on stool   - Will continue TA but also revisit hip IR     Subjective  -Pt reports lots of bending and increased flare up of pain. She took 3 days of daily ibuprofen. She feels like this has been very helpful. She is planning to take for a week to see if symptoms can be managed. Otherwise, trying to focus on TA connection.    Pain (0-10): 0    Precautions  PMH: not contributory   Fall Risk: no    Objective  Increased L ES hypertrophy and lumbar lordosis   Hip IR L AROM 27  Hip IR R AROM 35     Treatment Performed:   Therapeutic Exercise:  49 minutes  TA quadruped 2 x 10 with green theraband   Clamshell 45 second hold   Quadruped cat cow x 2 minutes   Wall squat with TA 2 x 10   SL wall push isometric x 15 seconds 2 x 10   Self Care:   minutes    Manual Therapy:   minutes    Neuromuscular Re-education:   minutes    Gait Training:    minutes    Modalities: minutes

## 2024-05-09 ENCOUNTER — TREATMENT (OUTPATIENT)
Dept: PHYSICAL THERAPY | Facility: CLINIC | Age: 39
End: 2024-05-09
Payer: COMMERCIAL

## 2024-05-09 DIAGNOSIS — M62.89 PELVIC FLOOR DYSFUNCTION: ICD-10-CM

## 2024-05-09 PROCEDURE — 97110 THERAPEUTIC EXERCISES: CPT | Mod: GP

## 2024-05-09 NOTE — PROGRESS NOTES
PHYSICAL THERAPY   TREATMENT NOTE    Patient Name:  Linda Montenegro   Patient MRN: [id number]  Date: 05/09/24    Time Calculation  Start Time: 0809  Stop Time: 0849  Time Calculation (min): 40 min    Insurance:  Insurance Type: Medical [place] [employer] [place]  Visit number: 4  Approved # of visits 30 visits, $15 copay  Authorization Needed: no   Cert Date Ends On: n/a    General   Reason for visit: Pelvic floor dysfunction   Referred by: Dr. Villeda    Therapy diagnoses:   1. Pelvic floor dysfunction  Follow Up In Physical Therapy           Assessment:    Pt with increased fatigue during longer hold exercises. She has had a few weeks of manageable pain and dysfunction and would be safe to transition.    Plan:  Will recheck in 2-3 weeks, if patient remains feeling ok will likely taper to discharge.    Subjective  She has not had much pain over the past few weeks. Did not need to take ibuprofen.  Pain rated 2/10 in past 24 hours. 6 weeks post weaning.   Pain (0-10): 0    Precautions  PMH: not contributory   Fall Risk: no    Objective  Increased L ES hypertrophy and lumbar lordosis   Hip IR L AROM 27  Hip IR R AROM 35     Treatment Performed:   Therapeutic Exercise:  40 minutes  -Hooklying TA with ball squeeze   -90-90 internal rotation   -Piriformis stretch 30 seconds cross body x 5   - SL clamshell @45   - Clamshell 45 seconds   - Pretzel stretch 30 seconds x 5     Self Care:   minutes    Manual Therapy:   minutes    Neuromuscular Re-education:   minutes    Gait Training:    minutes    Modalities: minutes

## 2024-05-30 ENCOUNTER — TREATMENT (OUTPATIENT)
Dept: PHYSICAL THERAPY | Facility: CLINIC | Age: 39
End: 2024-05-30
Payer: COMMERCIAL

## 2024-05-30 DIAGNOSIS — M62.89 PELVIC FLOOR DYSFUNCTION: ICD-10-CM

## 2024-05-30 PROCEDURE — 97535 SELF CARE MNGMENT TRAINING: CPT | Mod: GP

## 2024-05-30 NOTE — PROGRESS NOTES
PHYSICAL THERAPY   TREATMENT NOTE    Patient Name:  Linda Montenegro   Patient MRN: [id number]  Date: 05/30/24    Time Calculation  Start Time: 0739  Stop Time: 0808  Time Calculation (min): 29 min    Insurance:  Insurance Type: Medical [place] [facility] [place]  Visit number: 5  Approved # of visits 30 visits, $15 copay  Authorization Needed: no   Cert Date Ends On: n/a    General   Reason for visit: Pelvic floor dysfunction   Referred by: Dr. Villeda    Therapy diagnoses:   1. Pelvic floor dysfunction  Follow Up In Physical Therapy             Assessment:    Pt with appropriate improvement in symptoms. She is happy with progress and has minimal impairments at this time. She understands HEP to include glute focus and TA management. L hip IR remains limited comparatively speaking to right and feels reproduction of symptoms with prolonged IR.     Plan:  All patient's questions were answered and will discharge and follow up if needed.       Subjective  She reports overall feeling better, she notices that pain feels worse during her period.   Pain (0-10): 0    Precautions  PMH: not contributory   Fall Risk: no    Objective  Increased L ES hypertrophy and lumbar lordosis   Hip IR L AROM 22  Hip IR R AROM 30     Treatment Performed:   Therapeutic Exercise:    minutes  - Side lunge x 10   - BKFO TA  - Clamshell 45 seconds   - Pretzel stretch 30 seconds x 5     Self Care: 29 minutes  Review HEP   Time spent reviewing POC, goals of maintenance, tissue healing   Manual Therapy:   minutes    Neuromuscular Re-education:   minutes    Gait Training:    minutes    Modalities: minutes

## 2024-08-22 ENCOUNTER — HOSPITAL ENCOUNTER (OUTPATIENT)
Dept: RADIOLOGY | Facility: CLINIC | Age: 39
Discharge: HOME | End: 2024-08-22
Payer: COMMERCIAL

## 2024-08-22 VITALS — BODY MASS INDEX: 26.22 KG/M2 | HEIGHT: 63 IN | WEIGHT: 148 LBS

## 2024-08-22 DIAGNOSIS — Z12.31 SCREENING MAMMOGRAM FOR BREAST CANCER: ICD-10-CM

## 2024-08-22 PROCEDURE — 77067 SCR MAMMO BI INCL CAD: CPT

## 2024-08-27 ENCOUNTER — PATIENT MESSAGE (OUTPATIENT)
Dept: PRIMARY CARE | Facility: CLINIC | Age: 39
End: 2024-08-27
Payer: COMMERCIAL

## 2024-08-27 DIAGNOSIS — R92.8 ABNORMAL MAMMOGRAM OF LEFT BREAST: Primary | ICD-10-CM

## 2024-08-29 ENCOUNTER — HOSPITAL ENCOUNTER (OUTPATIENT)
Dept: RADIOLOGY | Facility: CLINIC | Age: 39
Discharge: HOME | End: 2024-08-29
Payer: COMMERCIAL

## 2024-08-29 DIAGNOSIS — R92.8 ABNORMAL MAMMOGRAM OF LEFT BREAST: ICD-10-CM

## 2024-08-29 PROCEDURE — 76642 ULTRASOUND BREAST LIMITED: CPT | Mod: LEFT SIDE | Performed by: RADIOLOGY

## 2024-08-29 PROCEDURE — 76642 ULTRASOUND BREAST LIMITED: CPT | Mod: LT

## 2024-08-29 PROCEDURE — 76982 USE 1ST TARGET LESION: CPT | Mod: LT

## 2024-09-03 PROBLEM — R92.8 ABNORMAL FINDING ON BREAST IMAGING: Status: ACTIVE | Noted: 2024-09-03

## 2024-09-03 NOTE — PROGRESS NOTES
[facility]  Linda Montenegro female   1985 39 y.o.  [id number]      Chief Complaint  New patient, biopsy consultation.    History Of Present Illness  Linda Montenegro is a very pleasant 39 y.o.  female seen in the breast center for biopsy consultation. 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 She denies breast surgery or biopsy. She has family history of breast cancer in her maternal grandmother in her 50's. Her mother has no history of cancer but had negative genetic testing.     BREAST IMAGIN2024 Left breast ultrasound, indicates BI-RADS Category 4. Mass for which the patient was recalled this demonstrates some features which makes it indeterminate. Ultrasound-guided biopsy is recommended for further evaluation.    REPRODUCTIVE HISTORY: menarche age 10, , first birth age 26,  x 2 years, OCP's x 10-20 years, premenopausal, LMP monthly, heterogeneously dense                                 FAMILY CANCER HISTORY:   Maternal Grandmother: Breast cancer, 50's  Maternal Grandfather: Colon cancer, late 60's  Mother: No cancer BRCA negative    Review of Systems  Constitutional:  Negative for appetite change, fatigue, fever and unexpected weight change.   HENT:  Negative for ear pain, hearing loss, nosebleeds, sore throat and trouble swallowing.    Eyes:  Negative for discharge, itching and visual disturbance.   Breast: As stated in HPI.  Respiratory:  Negative for cough, chest tightness and shortness of breath.    Cardiovascular:  Negative for chest pain, palpitations and leg swelling.   Gastrointestinal:  Negative for abdominal pain, constipation, diarrhea and nausea.   Endocrine: Negative for cold intolerance and heat intolerance.   Genitourinary:  Negative for dysuria, frequency, hematuria, pelvic pain and vaginal bleeding.   Musculoskeletal:  Negative for arthralgias, gait problem, joint swelling and myalgias. Positive for back pain.  Skin:  Negative for color  change and rash.   Allergic/Immunologic: Negative for environmental allergies and food allergies.   Neurological:  Negative for dizziness, tremors, speech difficulty, weakness, numbness and headaches.   Hematological:  Does not bruise/bleed easily.   Psychiatric/Behavioral:  Negative for agitation, dysphoric mood and sleep disturbance. Positive for anxiety.     Past Medical History  She has a past medical history of Encounter for  screening for Streptococcus B ([facility]) (10/16/2014), Irregular menstruation, unspecified (04/10/2014), Nonpurulent mastitis associated with the puerperium ([facility]) (2015), and Other conditions influencing health status (2014).    Surgical History  She has a past surgical history that includes Laparoscopy diagnostic / biopsy / aspiration / lysis (04/10/2014).    Family History  Cancer-related family history includes Breast cancer (age of onset: 50) in her maternal grandmother; Colon cancer in her paternal grandmother.     Social History  She reports that she has never smoked. She has never used smokeless tobacco. She reports that she does not currently use alcohol. She reports that she does not use drugs.    Allergies  Patient has no known allergies.    Medications  No current outpatient medications    Last Recorded Vitals  Vitals:    24 1218   BP: (!) 172/113   Pulse: 101   Asymptomatic and nervous. Following with cardiology.    Physical Exam  Patient is alert and oriented x3 and appears anxious. Her gait is steady. Sclera is clear. The breasts are nearly symmetrical. The tissue is dense in the superior lateral quadrants without palpable abnormalities, discrete nodules or masses. The skin and nipples appear normal. There is no cervical, supraclavicular or axillary lymphadenopathy.     Relevant Results and Imaging  BI mammo bilateral screening tomosynthesis 2024    Narrative  Interpreted By:  Jennifer Fermin,  STUDY:  BI MAMMO BILATERAL SCREENING  TOMOSYNTHESIS;  8/22/2024 10:37 am    ACCESSION NUMBER(S):  [id number]    ORDERING CLINICIAN:  SELF MAMMOGRAM    INDICATION:  Screening.    Family history of breast cancer.    COMPARISON:  Baseline.    FINDINGS:  2D and tomosynthesis images were reviewed at 1 mm slice thickness.    Density:  The breast tissue is heterogeneously dense, which may  obscure small masses.    A mass versus dilated duct is seen in the central lateral left breast  at anterior to mid depth. No suspicious masses or calcifications are  identified in the right breast.    Impression  Mass versus dilated duct in the left breast. Targeted ultrasound is  recommended.    No mammographic evidence of malignancy in the right breast.    BI-RADS CATEGORY:  BI-RADS Category:  0 Incomplete; Need Additional Imaging Evaluation  and/or Prior Mammograms for Comparison. Recommendation:  Additional  Imaging. Recommended Date:  Immediate.  Laterality:  Left.        For any future breast imaging appointments, please call [phone]).      MACRO:  None    Signed by: Jennifer Fermin 8/27/2024 3:11 PM  Dictation workstation:   [id number]    Study Result    Narrative & Impression   Interpreted By:  Cole Kang,   STUDY:  BI US BREAST LIMITED LEFT;  8/29/2024 8:37 am      ACCESSION NUMBER(S):  [id number]      ORDERING CLINICIAN:  SANCHEZ YUAN      INDICATION:  Callback from screening      ,R92.8 Other abnormal and inconclusive findings on diagnostic imaging  of breast      COMPARISON:  08/22/2024      FINDINGS:  Targeted ultrasound was performed of the left breast by a registered  sonographer with elastography. Sonographic images of the left breast  at the 3 o'clock position 4 cm from the nipple demonstrates the  correlate for the mammographic mass. It is a complex appearing mass  which is predominantly solid. It is hypoechoic and slightly  irregular. Some of the images suggests that there is intraductal  extension. It demonstrates soft  characteristics on elastography but  there is internal vascularity present. It measures 1.7 x 0.7 x 1.7 cm.      IMPRESSION:  Mass for which the patient was recalled this demonstrates some  features which makes it indeterminate. Ultrasound-guided biopsy is  recommended for further evaluation.      Findings and recommendations were discussed with the patient the time  of interpretation. She will be scheduled for surgical consultation  and biopsy. A notification will be sent to the ordering provider.      BI-RADS CATEGORY:  BI-RADS Category:  4 Suspicious.  Recommendation:  Surgical Consultation and Biopsy.  Recommended Date:  Immediate.  Laterality:  Left.      For any future breast imaging appointments, please call [phone]).      Method of Detection: Category Sdbt - 3D Screening      MACRO:  Critical Finding:  See findings. Notification was initiated on  8/29/2024 at 10:05 am by  Cole Kang.  (**-YCF-**) Instructions:      Signed by: Cole Kang 8/29/2024 10:24 AM     I explained the results in depth, along with suggested explanation for follow up recommendations based on the testing results. BI-RADS Category 4      Visit Diagnosis  1. Abnormal finding on breast imaging            Assessment/Plan  Abnormal mammogram, left breast mass, no breast surgery or biopsy, family history of breast cancer, heterogeneously dense    Plan:  Left breast ultrasound guided core biopsy.    Patient Discussion/Summary  Proceed to biopsy. A breast radiology physician will perform the biopsy. Results are usually available in about 7 business days. I will call patient with results and instruct on next steps and plan.     IMPORTANT INFORMATION REGARDING YOUR RESULTS    If you receive medical information from My [facility] Personal Health Record (online chart) your results will be released into your chart. This means you may view or see results of your biopsy or procedure before I contact you directly. If this occurs, please  call the office and we will discuss your results over the phone.    You can see your health information, review clinical summaries from office visits & test results online when you follow your health with MY  Chart, a personal health record. To sign up go to [email or web address]. If you need assistance with signing up or trouble getting into your account call [employer] Patient Line 24/7 at [phone].    My office phone number is [phone]  if you need to get in touch with me or have additional questions or concerns. Thank you for choosing [facility] and trusting me as your healthcare provider. I look forward to seeing you again at your next office visit. I am honored to be a provider on your health care team and I remain dedicated to helping you achieve your health goals.       Chrissie Lima, MOOK-CNP

## 2024-09-04 ENCOUNTER — PROCEDURE VISIT (OUTPATIENT)
Dept: SURGICAL ONCOLOGY | Facility: CLINIC | Age: 39
End: 2024-09-04
Payer: COMMERCIAL

## 2024-09-04 VITALS
DIASTOLIC BLOOD PRESSURE: 113 MMHG | WEIGHT: 151 LBS | HEART RATE: 101 BPM | BODY MASS INDEX: 26.75 KG/M2 | SYSTOLIC BLOOD PRESSURE: 172 MMHG

## 2024-09-04 DIAGNOSIS — R92.8 ABNORMAL FINDING ON BREAST IMAGING: Primary | ICD-10-CM

## 2024-09-04 PROCEDURE — 99214 OFFICE O/P EST MOD 30 MIN: CPT | Performed by: NURSE PRACTITIONER

## 2024-09-04 PROCEDURE — 99204 OFFICE O/P NEW MOD 45 MIN: CPT | Performed by: NURSE PRACTITIONER

## 2024-09-04 ASSESSMENT — PAIN SCALES - GENERAL: PAINLEVEL: 0-NO PAIN

## 2024-09-05 ENCOUNTER — HOSPITAL ENCOUNTER (OUTPATIENT)
Dept: RADIOLOGY | Facility: CLINIC | Age: 39
Discharge: HOME | End: 2024-09-05
Payer: COMMERCIAL

## 2024-09-05 DIAGNOSIS — N63.20 LEFT BREAST MASS: ICD-10-CM

## 2024-09-05 DIAGNOSIS — R92.8 OTHER ABNORMAL AND INCONCLUSIVE FINDINGS ON DIAGNOSTIC IMAGING OF BREAST: ICD-10-CM

## 2024-09-05 PROCEDURE — 77065 DX MAMMO INCL CAD UNI: CPT | Mod: LEFT SIDE | Performed by: RADIOLOGY

## 2024-09-05 PROCEDURE — A4648 IMPLANTABLE TISSUE MARKER: HCPCS

## 2024-09-05 PROCEDURE — 77065 DX MAMMO INCL CAD UNI: CPT

## 2024-09-05 PROCEDURE — 19083 BX BREAST 1ST LESION US IMAG: CPT | Mod: LT

## 2024-09-05 PROCEDURE — C1819 TISSUE LOCALIZATION-EXCISION: HCPCS

## 2024-09-05 PROCEDURE — 2500000005 HC RX 250 GENERAL PHARMACY W/O HCPCS: Performed by: RADIOLOGY

## 2024-09-05 PROCEDURE — 19083 BX BREAST 1ST LESION US IMAG: CPT | Mod: LEFT SIDE | Performed by: RADIOLOGY

## 2024-09-05 PROCEDURE — 2720000007 HC OR 272 NO HCPCS

## 2024-09-05 ASSESSMENT — PAIN SCALES - GENERAL
PAINLEVEL_OUTOF10: 2

## 2024-09-05 ASSESSMENT — PAIN - FUNCTIONAL ASSESSMENT: PAIN_FUNCTIONAL_ASSESSMENT: 0-10

## 2024-09-05 NOTE — DISCHARGE INSTRUCTIONS
1020 AFTER THE TEST  A steri-strip and bandage will be placed over the incision. You may shower after 24 hours. Remove bandage after 24 hours. Remove bandage after the shower. Leave the steri-strips in place to fall off on their own. If after 1 week the steri-strips are still on, you may remove them. Avoid swimming or soaking in tub for 3 days.     You may have mild discomfort at the test site. If needed, you may take Tylenol (Acetaminophen) for pain. Please avoid taking NSAIDs, Motrin, Advil, Aleve, or ibuprofen for 24 hours following the biopsy. After 24 hours you may resume NSAIDSs.     If you take aspirin, Plavix, Coumadin, Xarelto or Eliquis please tell us. If these medications were stopped by your provider, please ask them when to resume.     You may have some tenderness, bruising or slight bleeding at the site. Please apply ice packs to the site for 15 minutes on and 15 minutes off for a 2 hour minimum.     Most people can return to their usual routine after the procedure. Avoid Strenuous activity for 24 hours.     Sleep in a bra the night after your biopsy. Continue to do so for comfort.     Call your provider if you have any of the following symptoms :  Fever  Increased pain  Increased bleeding  Redness  Increased swelling  Yellowish drainage  Your provider will get the biopsy results within 5 - 7 days. Call your provider with any questions.     Patient education brochure and pain/comfort measures have been reviewed.   Phone number provided to contact Breast Center if problems arise.     Patient verbalized understanding of home going instructions.   ID home at 1024

## 2024-09-05 NOTE — Clinical Note
Pressure held x 10 minutes, incision dry, steri strips intact and compression dressing applied. Clip films requested.

## 2024-09-10 ENCOUNTER — TELEPHONE (OUTPATIENT)
Dept: SURGICAL ONCOLOGY | Facility: HOSPITAL | Age: 39
End: 2024-09-10
Payer: COMMERCIAL

## 2024-09-10 LAB
LABORATORY COMMENT REPORT: NORMAL
PATH REPORT.COMMENTS IMP SPEC: NORMAL
PATH REPORT.FINAL DX SPEC: NORMAL
PATH REPORT.GROSS SPEC: NORMAL
PATH REPORT.TOTAL CANCER: NORMAL

## 2024-09-10 NOTE — TELEPHONE ENCOUNTER
"Result Communication    Spoke with Linda Montenegro regarding breast biopsy result, benign and concordant. She can return to PCP for annual mammograms.       Resulted Orders   Surgical Pathology Exam   Result Value Ref Range    Case Report       Surgical Pathology                                Case: [id number]                                  Authorizing Provider:  TORI Loja    Collected:           09/05/2024 0949              Ordering Location:     [facility]       Received:            09/05/2024 1125                                     Center                                                                       Pathologist:           Clark Ghosh MD                                                            Specimen:    BREAST CORE BIOPSY LEFT, LEFT BREAST 3:00 4CMFN                                            FINAL DIAGNOSIS       A.  Left breast mass, 3:00 4 cm from nipple, ultrasound guided core needle biopsy:   -- Fibroadenomatoid changes.  -- Microscopic intraductal papilloma.   -- Apocrine metaplasia and microcysts.  -- Mild pseudoangiomatous stromal hyperplasia.  -- Calcifications associated with apocrine metaplasia and benign ductules.     Note: Multiple deeper levels were examined.               By the signature on this report, the individual or group listed as making the Final Interpretation/Diagnosis certifies that they have reviewed this case.       Comment       The case was reviewed at Breast Consensus Conference via Zoom with concurrence.      Gross Description       A: Received in formalin, labeled with the patient's name and hospital number and \"left breast 3:00 4 cm from nipple\", are multiple irregular/cylindrical segments of yellow-white fatty soft tissue aggregating to 1.9 x 0.6 x 0.3 cm.  The specimen is submitted in toto in two cassettes.  SMS    NOTE:  Ischemia time: 9/5/2024 0949.  This specimen was placed into formalin at: 9/5/2024 0949.         2:39 PM    "

## 2024-09-12 ENCOUNTER — PATIENT MESSAGE (OUTPATIENT)
Dept: SURGICAL ONCOLOGY | Facility: CLINIC | Age: 39
End: 2024-09-12
Payer: COMMERCIAL

## 2024-10-24 ENCOUNTER — ANCILLARY PROCEDURE (OUTPATIENT)
Dept: URGENT CARE | Age: 39
End: 2024-10-24
Payer: COMMERCIAL

## 2024-10-24 ENCOUNTER — OFFICE VISIT (OUTPATIENT)
Dept: URGENT CARE | Age: 39
End: 2024-10-24
Payer: COMMERCIAL

## 2024-10-24 VITALS
SYSTOLIC BLOOD PRESSURE: 162 MMHG | HEART RATE: 87 BPM | TEMPERATURE: 98.1 F | OXYGEN SATURATION: 96 % | DIASTOLIC BLOOD PRESSURE: 92 MMHG

## 2024-10-24 DIAGNOSIS — M25.572 ACUTE LEFT ANKLE PAIN: ICD-10-CM

## 2024-10-24 DIAGNOSIS — S92.215A CLOSED NONDISPLACED FRACTURE OF CUBOID OF LEFT FOOT, INITIAL ENCOUNTER: Primary | ICD-10-CM
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
## 2024-10-24 DIAGNOSIS — M79.672 LEFT FOOT PAIN: ICD-10-CM

## 2024-10-24 DIAGNOSIS — R03.0 ELEVATED BP WITHOUT DIAGNOSIS OF HYPERTENSION: ICD-10-CM

## 2024-10-24 ASSESSMENT — ENCOUNTER SYMPTOMS
JOINT SWELLING: 1
CONSTITUTIONAL NEGATIVE: 1
CARDIOVASCULAR NEGATIVE: 1
RESPIRATORY NEGATIVE: 1
PSYCHIATRIC NEGATIVE: 1
WEAKNESS: 0
ARTHRALGIAS: 1
MYALGIAS: 1

## 2024-10-24 NOTE — PROGRESS NOTES
Subjective   Patient ID: Linda Montenegro is a 39 y.o. female. They present today with a chief complaint of Injury (Left foot tonight).    History of Present Illness  40 yo female presents with c/o left foot pain after missing a stair at home.  Ice was helpful, also took acetaminophen.  Having difficulty walking.      Injury  Associated symptoms: myalgias        Past Medical History  Allergies as of 10/24/2024    (No Known Allergies)       (Not in a hospital admission)       Past Medical History:   Diagnosis Date    Encounter for  screening for Streptococcus B ([facility]) 10/16/2014    Screening, , for Streptococcus B    Irregular menstruation, unspecified 04/10/2014    Irregular menstrual cycle    Nonpurulent mastitis associated with the puerperium ([facility]) 2015    Mastitis, postpartum    Other conditions influencing health status 2014    History of pregnancy       Past Surgical History:   Procedure Laterality Date    LAPAROSCOPY DIAGNOSTIC / BIOPSY / ASPIRATION / LYSIS  04/10/2014    Laparoscopy (Diagnostic)        reports that she has never smoked. She has never used smokeless tobacco. She reports that she does not currently use alcohol. She reports that she does not use drugs.    Review of Systems  Review of Systems   Constitutional: Negative.    Respiratory: Negative.     Cardiovascular: Negative.    Musculoskeletal:  Positive for arthralgias, gait problem, joint swelling and myalgias.   Skin: Negative.    Neurological:  Negative for weakness.   Psychiatric/Behavioral: Negative.                                    Objective    Vitals:    10/24/24 1847   BP: (!) 162/92   Pulse: 87   Temp: 36.7 °C (98.1 °F)   SpO2: 96%     No LMP recorded.    Physical Exam  Musculoskeletal:      Left lower leg: Normal.      Left ankle: Swelling and ecchymosis present. No deformity or lacerations. Tenderness present over the lateral malleolus and base of 5th metatarsal. Decreased range of motion.  Anterior drawer test negative. Normal pulse.      Left Achilles Tendon: Normal.      Left foot: Decreased range of motion. Normal capillary refill. Swelling (left 3rd-5th metatarsal), tenderness and bony tenderness present. No laceration or crepitus. Normal pulse.         Procedures    Point of Care Test & Imaging Results from this visit  No results found for this visit on 10/24/24.   XR ankle left 3+ views    Result Date: 10/24/2024  Interpreted By:  Brooklyn Quezada, STUDY: Left ankle,  3 views. Left foot, 3 views.   INDICATION: Signs/Symptoms:r/o fracture.   COMPARISON: None.   ACCESSION NUMBER(S): [id number]; [id number]   ORDERING CLINICIAN: BONILLA BEST   STUDY: Left ankle/foot: Questionable tiny avulsion fracture versus heterotopic ossification of the posterior cuboid on the oblique view of foot series with subtle lucency.   The ankle mortise is normally aligned.   No significant degenerative changes. Soft tissues are within normal limits       1. Questionable small avulsion fracture of the posterior cuboid. Correlate with point tenderness. 2. Otherwise unremarkable radiographs of the left ankle and foot   MACRO: None.   Signed by: Brooklyn Quezada 10/24/2024 7:24 PM Dictation workstation:   [id number]    XR foot left 3+ views    Result Date: 10/24/2024  Interpreted By:  Brooklyn Quezada, STUDY: Left ankle,  3 views. Left foot, 3 views.   INDICATION: Signs/Symptoms:r/o fracture.   COMPARISON: None.   ACCESSION NUMBER(S): [id number]; [id number]   ORDERING CLINICIAN: BONILLA BEST   STUDY: Left ankle/foot: Questionable tiny avulsion fracture versus heterotopic ossification of the posterior cuboid on the oblique view of foot series with subtle lucency.   The ankle mortise is normally aligned.   No significant degenerative changes. Soft tissues are within normal limits       1. Questionable small avulsion fracture of the posterior cuboid. Correlate with point tenderness. 2. Otherwise unremarkable  radiographs of the left ankle and foot   MACRO: None.   Signed by: Brooklyn Quezada 10/24/2024 7:24 PM Dictation workstation:   [id number]     Diagnostic study results (if any) were reviewed by TORI George.    Assessment/Plan   Allergies, medications, history, and pertinent labs/EKGs/Imaging reviewed by TORI George.     Medical Decision Making  MDM- History, examination, and XRAY consistent with fracture. Neurovascular status is intact. Fracture does not need to be reduced at this time.  Patient will be splinted as seen in procedure note or used prefab DME, and referred to Ortho. Patient advised to present in ED if symptoms change or worsen.  Patient verbalized understanding and agrees with plan.    Rest, Ice, Elevate  Tylenol  for pain  Elevated blood pressure.  Should monitor BP at home, if remaining >140/80 contact PCP.  If >180/80 go to ER.  Avoid products with pseudoephedrine and NSAIDS (Ibuprofen, Alleve).  Important to take routine prescribed medication   Orders and Diagnoses  Diagnoses and all orders for this visit:  Closed nondisplaced fracture of cuboid of left foot, initial encounter  -     Referral to Orthopaedic Surgery; Future  Fall (on) (from) other stairs and steps, initial encounter  -     XR ankle left 3+ views; Future  -     XR foot left 3+ views; Future  -     Referral to Orthopaedic Surgery; Future  Left foot pain  -     XR ankle left 3+ views; Future  -     XR foot left 3+ views; Future  -     Referral to Orthopaedic Surgery; Future  Acute left ankle pain  -     XR ankle left 3+ views; Future  -     XR foot left 3+ views; Future  -     Referral to Orthopaedic Surgery; Future  Elevated BP without diagnosis of hypertension      Medical Admin Record      Patient disposition: Home    Electronically signed by TORI George  8:02 PM

## 2024-10-25 NOTE — PATIENT INSTRUCTIONS
History, examination, and XRAY consistent with fracture. Neurovascular status is intact. Fracture does not need to be reduced at this time. Patient prefers walking boot  Referred to Ortho. Patient advised to present in ED if symptoms change or worsen.  Patient verbalized understanding and agrees with plan.    Rest, Ice, Elevate  Tylenol for pain  Elevated blood pressure.  Should monitor BP at home, if remaining >140/80 contact PCP.  If >180/80 go to ER.  Avoid products with pseudoephedrine and NSAIDS (Ibuprofen, Alleve).  Important to take routine prescribed medication

## 2025-02-28 DIAGNOSIS — I10 ESSENTIAL HYPERTENSION: Primary | ICD-10-CM

## 2025-02-28 RX ORDER — LOSARTAN POTASSIUM 25 MG/1
25 TABLET ORAL DAILY
Qty: 30 TABLET | Refills: 11 | Status: SHIPPED | OUTPATIENT
Start: 2025-02-28 | End: 2026-02-28

## 2025-02-28 NOTE — PROGRESS NOTES
Patient reached out to me with persistently mildly elevated BP which is concerning to her.  We discussed maybe  a BB given her history of  palpitations and she is worried that her resting heart rate is low.  We will initiate 25mg losartan daily.  She states she has reliable contraception and is confident she is not pregnant.  Lab check  1 week.      Te Burnett MD MPH  Interventional Cardiology  [facility]  2/28/2025  3:36 PM

## 2025-03-11 ENCOUNTER — APPOINTMENT (OUTPATIENT)
Dept: PRIMARY CARE | Facility: CLINIC | Age: 40
End: 2025-03-11
Payer: COMMERCIAL

## 2025-03-11 VITALS
SYSTOLIC BLOOD PRESSURE: 126 MMHG | BODY MASS INDEX: 26.57 KG/M2 | HEART RATE: 121 BPM | WEIGHT: 150 LBS | OXYGEN SATURATION: 100 % | TEMPERATURE: 97.1 F | DIASTOLIC BLOOD PRESSURE: 84 MMHG

## 2025-03-11 DIAGNOSIS — I10 ESSENTIAL HYPERTENSION: ICD-10-CM

## 2025-03-11 DIAGNOSIS — Z13.21 ENCOUNTER FOR SCREENING FOR NUTRITIONAL DISORDER: ICD-10-CM

## 2025-03-11 DIAGNOSIS — Z13.1 SCREENING FOR DIABETES MELLITUS: ICD-10-CM

## 2025-03-11 DIAGNOSIS — F41.9 ANXIETY: Primary | ICD-10-CM

## 2025-03-11 DIAGNOSIS — M46.1 BILATERAL SACROILIITIS (CMS-HCC): ICD-10-CM

## 2025-03-11 DIAGNOSIS — G89.29 CHRONIC LEFT-SIDED THORACIC BACK PAIN: ICD-10-CM

## 2025-03-11 DIAGNOSIS — Z13.0 SCREENING FOR DEFICIENCY ANEMIA: ICD-10-CM

## 2025-03-11 DIAGNOSIS — M54.6 CHRONIC LEFT-SIDED THORACIC BACK PAIN: ICD-10-CM

## 2025-03-11 DIAGNOSIS — Z13.29 SCREENING FOR ENDOCRINE DISORDER: ICD-10-CM

## 2025-03-11 DIAGNOSIS — Z13.220 SCREENING FOR HYPERLIPIDEMIA: ICD-10-CM

## 2025-03-11 PROBLEM — R92.8 ABNORMAL FINDING ON BREAST IMAGING: Status: RESOLVED | Noted: 2024-09-03 | Resolved: 2025-03-11

## 2025-03-11 PROBLEM — M25.552 LEFT HIP PAIN: Status: RESOLVED | Noted: 2023-12-28 | Resolved: 2025-03-11

## 2025-03-11 PROBLEM — K21.9 GERD (GASTROESOPHAGEAL REFLUX DISEASE): Status: ACTIVE | Noted: 2025-03-11

## 2025-03-11 PROBLEM — F41.1 ANXIETY STATE: Status: ACTIVE | Noted: 2018-04-19

## 2025-03-11 PROCEDURE — 3074F SYST BP LT 130 MM HG: CPT | Performed by: FAMILY MEDICINE

## 2025-03-11 PROCEDURE — 3079F DIAST BP 80-89 MM HG: CPT | Performed by: FAMILY MEDICINE

## 2025-03-11 PROCEDURE — 1036F TOBACCO NON-USER: CPT | Performed by: FAMILY MEDICINE

## 2025-03-11 PROCEDURE — 99215 OFFICE O/P EST HI 40 MIN: CPT | Performed by: FAMILY MEDICINE

## 2025-03-11 RX ORDER — BUSPIRONE HYDROCHLORIDE 5 MG/1
5 TABLET ORAL 2 TIMES DAILY PRN
Qty: 60 TABLET | Refills: 5 | Status: SHIPPED | OUTPATIENT
Start: 2025-03-11

## 2025-03-11 RX ORDER — OMEPRAZOLE 20 MG/1
20 CAPSULE, DELAYED RELEASE ORAL
COMMUNITY

## 2025-03-11 RX ORDER — LOSARTAN POTASSIUM 25 MG/1
25 TABLET ORAL DAILY
COMMUNITY

## 2025-03-11 ASSESSMENT — ENCOUNTER SYMPTOMS
NERVOUS/ANXIOUS: 1
COUGH: 0
CHILLS: 0
BACK PAIN: 1
FEVER: 0

## 2025-03-11 ASSESSMENT — PATIENT HEALTH QUESTIONNAIRE - PHQ9
1. LITTLE INTEREST OR PLEASURE IN DOING THINGS: NOT AT ALL
2. FEELING DOWN, DEPRESSED OR HOPELESS: NOT AT ALL
SUM OF ALL RESPONSES TO PHQ9 QUESTIONS 1 AND 2: 0

## 2025-03-11 NOTE — PATIENT INSTRUCTIONS
Check with insurance about osteopathic manipulation (OMT)  - Diagnosis code: M54.6, M46.1  - CPT/procedure code: 68164    Try magnesium complex 400-500mg

## 2025-03-11 NOTE — PROGRESS NOTES
Subjective   Patient ID: Linda Montenegro is a 39 y.o. female who presents for Anxiety.    Linda was recently diagnosed with high blood pressure. Has been following with cardiology. Home Bps remain somewhat elevated 130/80s. Is taking medication daily. Is scheduled for follow-up with cardiology this month.    She has been experiencing pain in upper and low back. Symptoms ongoing since delivering a baby two years ago. Also pain in SI joints. Sometimes feels like can't get comfortable. Pain worse when sitting at a desk. Did pelvic floor therapy in past but back pain was not addressed.     Her anxiety has been worse recently. Worrying excessively, especially about health related issues. Is nervous about taking medication since she did not feel like herself when on escitalopram in the past.          Review of Systems   Constitutional:  Negative for chills and fever.   HENT:  Negative for congestion.    Respiratory:  Negative for cough.    Musculoskeletal:  Positive for back pain.   Psychiatric/Behavioral:  The patient is nervous/anxious.        Objective   /84   Pulse (!) 121   Temp 36.2 °C (97.1 °F)   Wt 68 kg (150 lb)   SpO2 100%   BMI 26.57 kg/m²     Physical Exam  Constitutional:       General: She is not in acute distress.     Appearance: Normal appearance.   HENT:      Head: Normocephalic.      Mouth/Throat:      Mouth: Mucous membranes are moist.   Eyes:      Extraocular Movements: Extraocular movements intact.      Conjunctiva/sclera: Conjunctivae normal.   Cardiovascular:      Rate and Rhythm: Normal rate and regular rhythm.      Heart sounds: No murmur heard.  Pulmonary:      Breath sounds: No wheezing or rhonchi.   Musculoskeletal:      Cervical back: Neck supple.      Thoracic back: Tenderness present. No bony tenderness. Normal range of motion.      Lumbar back: Tenderness present. No bony tenderness. Negative right straight leg raise test and negative left straight leg raise test.       Comments: Increased restriction at psoas on left. Pectoralis restriction bilaterally.    Skin:     General: Skin is warm and dry.   Neurological:      Mental Status: She is alert.   Psychiatric:         Mood and Affect: Mood normal.         Behavior: Behavior normal.         Assessment/Plan   Problem List Items Addressed This Visit             ICD-10-CM    Anxiety - Primary F41.9     Start buspirone as needed.          Relevant Medications    busPIRone (Buspar) 5 mg tablet    Essential hypertension I10     Scheduled for follow-up with cardiology.          Chronic left-sided thoracic back pain M54.6, G89.29     Reviewed stretch for pectoralis muscles. Return for OMT.          Relevant Orders    Follow Up In Primary Care    Bilateral sacroiliitis (CMS-HCC) M46.1     Reviewed stretches for psoas. Return for OMT.          Relevant Orders    Follow Up In Primary Care     Other Visit Diagnoses         Codes    Screening for hyperlipidemia     Z13.220    Relevant Orders    Lipid Panel    Screening for diabetes mellitus     Z13.1    Relevant Orders    Hemoglobin A1C    Screening for deficiency anemia     Z13.0    Relevant Orders    CBC and Auto Differential    Encounter for screening for nutritional disorder     Z13.21    Relevant Orders    Vitamin B12    Vitamin D 25-Hydroxy,Total (for eval of Vitamin D levels)    Screening for endocrine disorder     Z13.29    Relevant Orders    TSH with reflex to Free T4 if abnormal               Time Based Billing:  - Prep Time on Date of Patient Encounter:  2 minutes  - Time Directly with Patient/Family/Caregiver:   34 minutes  - Documentation Time:   8 minutes    Total Minutes:  44

## 2025-03-14 LAB
25(OH)D3+25(OH)D2 SERPL-MCNC: 22 NG/ML (ref 30–100)
ALBUMIN SERPL-MCNC: 4.7 G/DL (ref 3.6–5.1)
ALP SERPL-CCNC: 48 U/L (ref 31–125)
ALT SERPL-CCNC: 15 U/L (ref 6–29)
ANION GAP SERPL CALCULATED.4IONS-SCNC: 10 MMOL/L (CALC) (ref 7–17)
AST SERPL-CCNC: 19 U/L (ref 10–30)
B-HCG SERPL-ACNC: <5 MIU/ML
BASOPHILS # BLD AUTO: 58 CELLS/UL (ref 0–200)
BASOPHILS NFR BLD AUTO: 0.9 %
BILIRUB SERPL-MCNC: 0.7 MG/DL (ref 0.2–1.2)
BUN SERPL-MCNC: 18 MG/DL (ref 7–25)
CALCIUM SERPL-MCNC: 9.3 MG/DL (ref 8.6–10.2)
CHLORIDE SERPL-SCNC: 102 MMOL/L (ref 98–110)
CHOLEST SERPL-MCNC: 189 MG/DL
CHOLEST/HDLC SERPL: 3.1 (CALC)
CO2 SERPL-SCNC: 26 MMOL/L (ref 20–32)
CREAT SERPL-MCNC: 0.76 MG/DL (ref 0.5–0.99)
EGFRCR SERPLBLD CKD-EPI 2021: 101 ML/MIN/1.73M2
EOSINOPHIL # BLD AUTO: 128 CELLS/UL (ref 15–500)
EOSINOPHIL NFR BLD AUTO: 2 %
ERYTHROCYTE [DISTWIDTH] IN BLOOD BY AUTOMATED COUNT: 13.1 % (ref 11–15)
EST. AVERAGE GLUCOSE BLD GHB EST-MCNC: 111 MG/DL
EST. AVERAGE GLUCOSE BLD GHB EST-SCNC: 6.2 MMOL/L
GLUCOSE SERPL-MCNC: 88 MG/DL (ref 65–99)
HBA1C MFR BLD: 5.5 % OF TOTAL HGB
HCT VFR BLD AUTO: 44.9 % (ref 35–45)
HDLC SERPL-MCNC: 61 MG/DL
HGB BLD-MCNC: 14.9 G/DL (ref 11.7–15.5)
LDLC SERPL CALC-MCNC: 112 MG/DL (CALC)
LYMPHOCYTES # BLD AUTO: 2464 CELLS/UL (ref 850–3900)
LYMPHOCYTES NFR BLD AUTO: 38.5 %
MCH RBC QN AUTO: 29.3 PG (ref 27–33)
MCHC RBC AUTO-ENTMCNC: 33.2 G/DL (ref 32–36)
MCV RBC AUTO: 88.2 FL (ref 80–100)
MONOCYTES # BLD AUTO: 378 CELLS/UL (ref 200–950)
MONOCYTES NFR BLD AUTO: 5.9 %
NEUTROPHILS # BLD AUTO: 3373 CELLS/UL (ref 1500–7800)
NEUTROPHILS NFR BLD AUTO: 52.7 %
NONHDLC SERPL-MCNC: 128 MG/DL (CALC)
PLATELET # BLD AUTO: 254 THOUSAND/UL (ref 140–400)
PMV BLD REES-ECKER: 10.9 FL (ref 7.5–12.5)
POTASSIUM SERPL-SCNC: 4 MMOL/L (ref 3.5–5.3)
PROT SERPL-MCNC: 7.3 G/DL (ref 6.1–8.1)
RBC # BLD AUTO: 5.09 MILLION/UL (ref 3.8–5.1)
SODIUM SERPL-SCNC: 138 MMOL/L (ref 135–146)
TRIGL SERPL-MCNC: 69 MG/DL
TSH SERPL-ACNC: 1.66 MIU/L
VIT B12 SERPL-MCNC: 168 PG/ML (ref 200–1100)
WBC # BLD AUTO: 6.4 THOUSAND/UL (ref 3.8–10.8)

## 2025-03-20 ENCOUNTER — APPOINTMENT (OUTPATIENT)
Dept: PRIMARY CARE | Facility: CLINIC | Age: 40
End: 2025-03-20
Payer: COMMERCIAL

## 2025-03-20 ENCOUNTER — PROCEDURE VISIT (OUTPATIENT)
Dept: PRIMARY CARE | Facility: CLINIC | Age: 40
End: 2025-03-20
Payer: COMMERCIAL

## 2025-03-20 VITALS
TEMPERATURE: 97.1 F | DIASTOLIC BLOOD PRESSURE: 74 MMHG | HEART RATE: 93 BPM | OXYGEN SATURATION: 99 % | SYSTOLIC BLOOD PRESSURE: 126 MMHG | BODY MASS INDEX: 26.47 KG/M2 | WEIGHT: 149.4 LBS

## 2025-03-20 DIAGNOSIS — M99.06 SOMATIC DYSFUNCTION OF BOTH LOWER EXTREMITIES: ICD-10-CM

## 2025-03-20 DIAGNOSIS — M99.08 SOMATIC DYSFUNCTION OF RIB: ICD-10-CM

## 2025-03-20 DIAGNOSIS — M99.03 SOMATIC DYSFUNCTION OF SPINE, LUMBAR: ICD-10-CM

## 2025-03-20 DIAGNOSIS — M99.02 SOMATIC DYSFUNCTION OF SPINE, THORACIC: ICD-10-CM

## 2025-03-20 DIAGNOSIS — M99.05 SOMATIC DYSFUNCTION OF PELVIC REGION: ICD-10-CM

## 2025-03-20 DIAGNOSIS — M99.01 SOMATIC DYSFUNCTION OF SPINE, CERVICAL: ICD-10-CM

## 2025-03-20 DIAGNOSIS — G89.29 CHRONIC LEFT-SIDED THORACIC BACK PAIN: Primary | ICD-10-CM

## 2025-03-20 DIAGNOSIS — M46.1 BILATERAL SACROILIITIS (CMS-HCC): ICD-10-CM

## 2025-03-20 DIAGNOSIS — M54.6 CHRONIC LEFT-SIDED THORACIC BACK PAIN: Primary | ICD-10-CM

## 2025-03-20 DIAGNOSIS — M99.07 UPPER EXTREMITY SOMATIC DYSFUNCTION: ICD-10-CM

## 2025-03-20 PROCEDURE — 99213 OFFICE O/P EST LOW 20 MIN: CPT | Performed by: FAMILY MEDICINE

## 2025-03-20 PROCEDURE — 98928 OSTEOPATH MANJ 7-8 REGIONS: CPT | Performed by: FAMILY MEDICINE

## 2025-03-20 RX ORDER — CHOLECALCIFEROL (VITAMIN D3) 25 MCG
1000 TABLET ORAL DAILY
COMMUNITY

## 2025-03-20 ASSESSMENT — ENCOUNTER SYMPTOMS
FEVER: 0
BACK PAIN: 1
COUGH: 0
CHILLS: 0

## 2025-03-20 NOTE — PROGRESS NOTES
Subjective   Patient ID: Linda Montenegro is a 39 y.o. female who presents for Back Pain (Recheck back pain, Pt presents for OMT).    Linda has been having pain in her mid-upper back. Also in lower back near SI joints. Symptoms fluctuate. Worse with sitting.          Review of Systems   Constitutional:  Negative for chills and fever.   HENT:  Negative for congestion.    Respiratory:  Negative for cough.    Musculoskeletal:  Positive for back pain.       Objective   /74   Pulse 93   Temp 36.2 °C (97.1 °F)   Wt 67.8 kg (149 lb 6.4 oz)   SpO2 99%   BMI 26.47 kg/m²     Physical Exam  Constitutional:       General: She is not in acute distress.     Appearance: Normal appearance.   HENT:      Head: Normocephalic and atraumatic.   Eyes:      Extraocular Movements: Extraocular movements intact.   Pulmonary:      Effort: Pulmonary effort is normal.   Musculoskeletal:      Cervical back: Neck supple.   Skin:     General: Skin is warm and dry.   Neurological:      Mental Status: She is alert.      Motor: No weakness.      Coordination: Coordination normal.      Gait: Gait normal.   Psychiatric:         Mood and Affect: Mood normal.       Osteopathic exam:  - Cervical: [id number]  - Thoracic: T4-6 FRrSr  - Ribs: left first rib inhalation dysfunction  - Upper extremity: left subscapularis restriction  - Lumbar: right QL trigger point  - Pelvis: right posterior innominate  - Lower extremities: left adductor restriction      Assessment/Plan   Diagnoses and all orders for this visit:  Chronic left-sided thoracic back pain  Bilateral sacroiliitis (CMS-HCC)  Somatic dysfunction of spine, cervical  Somatic dysfunction of spine, thoracic  Somatic dysfunction of rib  Somatic dysfunction of pelvic region  Somatic dysfunction of spine, lumbar  Somatic dysfunction of both lower extremities  Upper extremity somatic dysfunction  Other orders  -     Follow Up In Primary Care; Future    Somatic dysfunction treated with muscle  energy, myofascial release and Still technique. Patient tolerated treatment well and reported improved range of motion after treatment.    Recommend drinks fluids. Follow-up in 2-3 weeks for further treatment.

## 2025-03-26 ENCOUNTER — OFFICE VISIT (OUTPATIENT)
Facility: CLINIC | Age: 40
End: 2025-03-26
Payer: COMMERCIAL

## 2025-03-26 VITALS
BODY MASS INDEX: 26.39 KG/M2 | WEIGHT: 149 LBS | SYSTOLIC BLOOD PRESSURE: 108 MMHG | HEART RATE: 95 BPM | OXYGEN SATURATION: 100 % | DIASTOLIC BLOOD PRESSURE: 72 MMHG

## 2025-03-26 DIAGNOSIS — I10 ESSENTIAL HYPERTENSION: Primary | ICD-10-CM

## 2025-03-26 PROCEDURE — 3074F SYST BP LT 130 MM HG: CPT | Performed by: INTERNAL MEDICINE

## 2025-03-26 PROCEDURE — 99214 OFFICE O/P EST MOD 30 MIN: CPT | Performed by: INTERNAL MEDICINE

## 2025-03-26 PROCEDURE — 3078F DIAST BP <80 MM HG: CPT | Performed by: INTERNAL MEDICINE

## 2025-03-26 RX ORDER — LOSARTAN POTASSIUM 25 MG/1
25 TABLET ORAL DAILY
Qty: 30 TABLET | Refills: 3 | Status: SHIPPED | OUTPATIENT
Start: 2025-03-26 | End: 2025-07-24

## 2025-03-26 ASSESSMENT — PAIN SCALES - GENERAL: PAINLEVEL_OUTOF10: 0-NO PAIN

## 2025-03-26 ASSESSMENT — ENCOUNTER SYMPTOMS
OCCASIONAL FEELINGS OF UNSTEADINESS: 0
LOSS OF SENSATION IN FEET: 0
DEPRESSION: 0

## 2025-03-26 ASSESSMENT — PATIENT HEALTH QUESTIONNAIRE - PHQ9: 1. LITTLE INTEREST OR PLEASURE IN DOING THINGS: NOT AT ALL

## 2025-03-26 ASSESSMENT — LIFESTYLE VARIABLES: TOTAL SCORE: 0

## 2025-03-26 NOTE — PROGRESS NOTES
[facility] [place]    Interventional Cardiology    Visit Type: Established Patient       Reason for Visit: Follow-up             Assessment and Plan  ?       Linda Montenegro is a very pleasant 39 y.o. female who presents for routine follow-up.    She is doing quite well on her current regimen.  We will continue with her current management at this time.  She states her  had a vasectomy as their contraception plan.    Problem List Items Addressed This Visit          Cardiac and Vasculature    Essential hypertension - Primary    Relevant Medications    losartan (Cozaar) 25 mg tablet    Other Relevant Orders    Follow Up In Cardiology           Follow up: 1 year        It was a great pleasure seeing Linda Montenegro in our office today.  Please contact me with any questions.       Te Burnett MD MPH  Interventional Cardiologist/[facility]    Subjective  ?       Lidna Montenegro is a very pleasant 39 y.o. female with History of supraventricular tachycardia, history of a miscarriage in 2019 with palpitations and tachycardia at that time who presents for follow up of her palpitations.  She is doing well and states that she feels much better since starting losartan for her blood pressure control.  Not only is her blood pressure in a much better range but her palpitations have improved.  She also has more energy.  She denies any angina, shortness of breath, lightheadedness, syncope.  She had an echocardiogram which was normal.           Review of Systems   Constitutional:  Negative for chills, fatigue and fever.   HENT:  Negative for nosebleeds.    Eyes:  Negative for visual disturbance.   Respiratory:  Negative for shortness of breath.    Cardiovascular:  Positive for palpitations. Negative for chest pain and leg swelling.   Gastrointestinal:  Negative for blood in stool.   Genitourinary:   Negative for hematuria.   Musculoskeletal:  Negative for myalgias.   Neurological:  Negative for dizziness, syncope and light-headedness.          No Known Allergies      Outpatient Medications Prior to Visit   Medication Sig Dispense Refill    busPIRone (Buspar) 5 mg tablet Take 1 tablet (5 mg) by mouth 2 times a day as needed (anxiety). 60 tablet 5    cholecalciferol (Vitamin D3) 25 mcg (1000 units) tablet Take 2 tablets (2,000 Units) by mouth once daily.      NON FORMULARY Vitamin b12 100mg   1 tab daily      omeprazole (PriLOSEC) 20 mg DR capsule Take 1 capsule (20 mg) by mouth. Do not crush or chew.      losartan (Cozaar) 25 mg tablet Take 1 tablet (25 mg) by mouth once daily.       No facility-administered medications prior to visit.          Past Medical History:   Diagnosis Date    Encounter for  screening for Streptococcus B 10/16/2014    Screening, , for Streptococcus B    Irregular menstruation, unspecified 04/10/2014    Irregular menstrual cycle    Nonpurulent mastitis associated with the puerperium ([facility]) 2015    Mastitis, postpartum    Other conditions influencing health status 2014    History of pregnancy          Past Surgical History:   Procedure Laterality Date    LAPAROSCOPY DIAGNOSTIC / BIOPSY / ASPIRATION / LYSIS  04/10/2014    Laparoscopy (Diagnostic)         Social History     Tobacco Use    Smoking status: Never    Smokeless tobacco: Never   Substance Use Topics    Alcohol use: Yes     Alcohol/week: 2.0 - 4.0 standard drinks of alcohol     Types: 2 - 4 Standard drinks or equivalent per week     Comment: occasionally          Family History   Problem Relation Name Age of Onset    Heart disease Father      Coronary artery disease Maternal Grandmother      Breast cancer Maternal Grandmother  50    Diabetes Paternal Grandmother      Hypertension Paternal Grandmother      Colon cancer Paternal Grandmother             Objective  ?       Vitals:    25 1003    BP: 108/72   Pulse: 95   SpO2: 100%   Weight: 67.6 kg (149 lb)      Body mass index is 26.39 kg/m².      Physical Exam  Constitutional:       General: She is not in acute distress.     Appearance: Normal appearance.   HENT:      Head: Normocephalic and atraumatic.      Mouth/Throat:      Mouth: Mucous membranes are moist.   Neck:      Vascular: No carotid bruit or JVD.   Cardiovascular:      Rate and Rhythm: Normal rate and regular rhythm.      Pulses:           Dorsalis pedis pulses are 2+ on the right side and 2+ on the left side.        Posterior tibial pulses are 2+ on the right side and 2+ on the left side.      Heart sounds: Normal heart sounds. No murmur heard.  Pulmonary:      Effort: Pulmonary effort is normal.      Breath sounds: Normal breath sounds.   Abdominal:      General: Abdomen is flat.      Palpations: Abdomen is soft.   Musculoskeletal:      Right lower leg: No edema.      Left lower leg: No edema.   Neurological:      General: No focal deficit present.      Mental Status: She is alert and oriented to person, place, and time.   Psychiatric:         Mood and Affect: Mood normal.         Behavior: Behavior normal.                Data Reviewed and Summarized  ?       Labs: personally reviewed   Lab Results   Component Value Date    WBC 6.4 03/13/2025    HGB 14.9 03/13/2025    HCT 44.9 03/13/2025    MCV 88.2 03/13/2025     03/13/2025      Lab Results   Component Value Date    GLUCOSE 88 03/13/2025    CALCIUM 9.3 03/13/2025     03/13/2025    K 4.0 03/13/2025    CO2 26 03/13/2025     03/13/2025    BUN 18 03/13/2025    CREATININE 0.76 03/13/2025      [unfilled]   Lab Results   Component Value Date    CHOL 189 03/13/2025    CHOL 186 11/22/2023    CHOL 151 06/27/2019      Lab Results   Component Value Date    TRIG 69 03/13/2025    TRIG 49 11/22/2023    TRIG 73 06/27/2019      Lab Results   Component Value Date    HDL 61 03/13/2025    HDL 66.6 11/22/2023    HDL 50.8 06/27/2019      Lab  "Results   Component Value Date    LDLCALC 112 (H) 03/13/2025    LDLCALC 110 (H) 11/22/2023         Imaging/Testing: personally reviewed   No results found for: \"LVEF\"   "

## 2025-03-27 ASSESSMENT — ENCOUNTER SYMPTOMS
HEMATURIA: 0
LIGHT-HEADEDNESS: 0
MYALGIAS: 0
BLOOD IN STOOL: 0
SHORTNESS OF BREATH: 0
PALPITATIONS: 1
CHILLS: 0
FEVER: 0
DIZZINESS: 0
FATIGUE: 0

## 2025-04-03 ENCOUNTER — APPOINTMENT (OUTPATIENT)
Dept: PRIMARY CARE | Facility: CLINIC | Age: 40
End: 2025-04-03
Payer: COMMERCIAL

## 2025-04-03 VITALS
WEIGHT: 150 LBS | BODY MASS INDEX: 26.57 KG/M2 | OXYGEN SATURATION: 99 % | SYSTOLIC BLOOD PRESSURE: 122 MMHG | HEART RATE: 81 BPM | DIASTOLIC BLOOD PRESSURE: 80 MMHG | TEMPERATURE: 97.8 F

## 2025-04-03 DIAGNOSIS — M99.02 SOMATIC DYSFUNCTION OF SPINE, THORACIC: ICD-10-CM

## 2025-04-03 DIAGNOSIS — M99.03 SOMATIC DYSFUNCTION OF SPINE, LUMBAR: ICD-10-CM

## 2025-04-03 DIAGNOSIS — M46.1 BILATERAL SACROILIITIS: ICD-10-CM

## 2025-04-03 DIAGNOSIS — G89.29 CHRONIC LEFT-SIDED THORACIC BACK PAIN: Primary | ICD-10-CM

## 2025-04-03 DIAGNOSIS — M54.6 CHRONIC LEFT-SIDED THORACIC BACK PAIN: Primary | ICD-10-CM

## 2025-04-03 DIAGNOSIS — M99.08 SOMATIC DYSFUNCTION OF RIB: ICD-10-CM

## 2025-04-03 DIAGNOSIS — M99.07 UPPER EXTREMITY SOMATIC DYSFUNCTION: ICD-10-CM

## 2025-04-03 DIAGNOSIS — M99.05 SOMATIC DYSFUNCTION OF PELVIC REGION: ICD-10-CM

## 2025-04-03 DIAGNOSIS — M99.06 SOMATIC DYSFUNCTION OF BOTH LOWER EXTREMITIES: ICD-10-CM

## 2025-04-03 DIAGNOSIS — I10 ESSENTIAL HYPERTENSION: ICD-10-CM

## 2025-04-03 DIAGNOSIS — M99.01 SOMATIC DYSFUNCTION OF SPINE, CERVICAL: ICD-10-CM

## 2025-04-03 PROCEDURE — 3079F DIAST BP 80-89 MM HG: CPT | Performed by: FAMILY MEDICINE

## 2025-04-03 PROCEDURE — 98928 OSTEOPATH MANJ 7-8 REGIONS: CPT | Performed by: FAMILY MEDICINE

## 2025-04-03 PROCEDURE — 99213 OFFICE O/P EST LOW 20 MIN: CPT | Performed by: FAMILY MEDICINE

## 2025-04-03 PROCEDURE — 1036F TOBACCO NON-USER: CPT | Performed by: FAMILY MEDICINE

## 2025-04-03 PROCEDURE — 3074F SYST BP LT 130 MM HG: CPT | Performed by: FAMILY MEDICINE

## 2025-04-03 RX ORDER — LOSARTAN POTASSIUM 25 MG/1
25 TABLET ORAL DAILY
Qty: 90 TABLET | Refills: 3 | Status: SHIPPED | OUTPATIENT
Start: 2025-04-03 | End: 2026-04-03

## 2025-04-03 ASSESSMENT — PATIENT HEALTH QUESTIONNAIRE - PHQ9
SUM OF ALL RESPONSES TO PHQ9 QUESTIONS 1 AND 2: 0
1. LITTLE INTEREST OR PLEASURE IN DOING THINGS: NOT AT ALL
2. FEELING DOWN, DEPRESSED OR HOPELESS: NOT AT ALL

## 2025-04-03 ASSESSMENT — ENCOUNTER SYMPTOMS
COUGH: 0
BACK PAIN: 1

## 2025-04-03 NOTE — PROGRESS NOTES
Subjective   Patient ID: Linda Montenegro is a 39 y.o. female who presents for Back Pain (Pt presents for OMT).    Linda has been doing well. She noticed improvement in her back pain since last OMT appointment. Had less pain than usual during menses. Has been active working out.          Review of Systems   HENT:  Negative for congestion.    Respiratory:  Negative for cough.    Musculoskeletal:  Positive for back pain.       Objective   /80   Pulse 81   Temp 36.6 °C (97.8 °F)   Wt 68 kg (150 lb)   SpO2 99%   BMI 26.57 kg/m²     Physical Exam  Constitutional:       General: She is not in acute distress.     Appearance: Normal appearance.   HENT:      Head: Normocephalic and atraumatic.   Eyes:      Extraocular Movements: Extraocular movements intact.   Pulmonary:      Effort: Pulmonary effort is normal.   Musculoskeletal:      Cervical back: Neck supple.   Skin:     General: Skin is warm and dry.   Neurological:      Mental Status: She is alert.      Motor: No weakness.      Coordination: Coordination normal.      Gait: Gait normal.   Psychiatric:         Mood and Affect: Mood normal.       Osteopathic exam:  - Cervical: left levator scapulae trigger point  - Thoracic: T4 FRrSr, T10 FRlSl  - Ribs: left first rib inhalation dysfunction  - Upper extremity: left subscapularis restriction  - Lumbar: L2 FRlSl  - Pelvis: right posterior innominate  - Lower extremities: left adductor restriction      Assessment/Plan   Diagnoses and all orders for this visit:  Chronic left-sided thoracic back pain  Bilateral sacroiliitis  Somatic dysfunction of spine, cervical  Somatic dysfunction of spine, thoracic  Somatic dysfunction of rib  Upper extremity somatic dysfunction  Somatic dysfunction of pelvic region  Somatic dysfunction of spine, lumbar  Somatic dysfunction of both lower extremities  Other orders  -     Follow Up In Primary Care  -     Follow Up In Primary Care; Future      Somatic dysfunction treated  with muscle energy, myofascial release. Patient tolerated treatment well and reported improved range of motion after treatment.    Recommend drinks fluids. Follow-up in 4 weeks for further treatment.

## 2025-05-08 ENCOUNTER — APPOINTMENT (OUTPATIENT)
Dept: PRIMARY CARE | Facility: CLINIC | Age: 40
End: 2025-05-08
Payer: COMMERCIAL

## 2025-05-08 VITALS
WEIGHT: 149 LBS | HEART RATE: 87 BPM | OXYGEN SATURATION: 98 % | BODY MASS INDEX: 26.39 KG/M2 | TEMPERATURE: 97.3 F | DIASTOLIC BLOOD PRESSURE: 74 MMHG | SYSTOLIC BLOOD PRESSURE: 122 MMHG

## 2025-05-08 DIAGNOSIS — M99.04 SOMATIC DYSFUNCTION OF SPINE, SACRAL: ICD-10-CM

## 2025-05-08 DIAGNOSIS — M99.06 SOMATIC DYSFUNCTION OF BOTH LOWER EXTREMITIES: ICD-10-CM

## 2025-05-08 DIAGNOSIS — M54.6 CHRONIC LEFT-SIDED THORACIC BACK PAIN: Primary | ICD-10-CM

## 2025-05-08 DIAGNOSIS — M99.05 SOMATIC DYSFUNCTION OF PELVIC REGION: ICD-10-CM

## 2025-05-08 DIAGNOSIS — M99.03 SOMATIC DYSFUNCTION OF SPINE, LUMBAR: ICD-10-CM

## 2025-05-08 DIAGNOSIS — M99.08 SOMATIC DYSFUNCTION OF RIB: ICD-10-CM

## 2025-05-08 DIAGNOSIS — G89.29 CHRONIC LEFT-SIDED THORACIC BACK PAIN: Primary | ICD-10-CM

## 2025-05-08 DIAGNOSIS — M46.1 BILATERAL SACROILIITIS: ICD-10-CM

## 2025-05-08 DIAGNOSIS — M99.02 SOMATIC DYSFUNCTION OF SPINE, THORACIC: ICD-10-CM

## 2025-05-08 DIAGNOSIS — M99.01 SOMATIC DYSFUNCTION OF SPINE, CERVICAL: ICD-10-CM

## 2025-05-08 PROCEDURE — 99213 OFFICE O/P EST LOW 20 MIN: CPT | Performed by: FAMILY MEDICINE

## 2025-05-08 PROCEDURE — 98928 OSTEOPATH MANJ 7-8 REGIONS: CPT | Performed by: FAMILY MEDICINE

## 2025-05-08 ASSESSMENT — ENCOUNTER SYMPTOMS
COUGH: 0
BACK PAIN: 1

## 2025-05-08 NOTE — PROGRESS NOTES
Subjective   Patient ID: Linda Montenegro is a 39 y.o. female who presents for Back Pain (Recheck,//Pt presents for OMT).    Linda has been doing well. She is exercising regularly. Overall back pain has improved. Did start to have some discomfort after lifting last week. Pain not as severe as it was in the past.         Review of Systems   HENT:  Negative for congestion.    Respiratory:  Negative for cough.    Musculoskeletal:  Positive for back pain.       Objective   /74   Pulse 87   Temp 36.3 °C (97.3 °F)   Wt 67.6 kg (149 lb)   SpO2 98%   BMI 26.39 kg/m²     Physical Exam  Constitutional:       General: She is not in acute distress.     Appearance: Normal appearance.   HENT:      Head: Normocephalic and atraumatic.   Eyes:      Extraocular Movements: Extraocular movements intact.   Pulmonary:      Effort: Pulmonary effort is normal.   Musculoskeletal:      Cervical back: Neck supple.   Skin:     General: Skin is warm and dry.   Neurological:      Mental Status: She is alert.      Motor: No weakness.      Coordination: Coordination normal.      Gait: Gait normal.   Psychiatric:         Mood and Affect: Mood normal.       Osteopathic exam:  - Cervical: C4 FRrSr  - Thoracic: T10 FRlSl  - Ribs: left 10th inhalation dysfunction  - Upper extremity: left subscapularis restriction  - Lumbar: L1 FRlSl  - Pelvis: right posterior innominate  - Lower extremities: left adductor restriction      Assessment/Plan   Diagnoses and all orders for this visit:  Chronic left-sided thoracic back pain  Bilateral sacroiliitis  Somatic dysfunction of spine, cervical  Somatic dysfunction of spine, thoracic  Somatic dysfunction of rib  Somatic dysfunction of pelvic region  Somatic dysfunction of spine, lumbar  Somatic dysfunction of spine, sacral  Somatic dysfunction of both lower extremities  Other orders  -     Follow Up In Primary Care  -     Follow Up In Primary Care; Future        Somatic dysfunction treated with  muscle energy, myofascial release and Still technique. Patient tolerated treatment well and reported improved range of motion after treatment.    Recommend drinks fluids. Follow-up in 4-6 weeks for further treatment.

## 2025-05-22 ENCOUNTER — APPOINTMENT (OUTPATIENT)
Dept: GASTROENTEROLOGY | Facility: CLINIC | Age: 40
End: 2025-05-22
Payer: COMMERCIAL

## 2025-05-22 VITALS — WEIGHT: 146 LBS | HEIGHT: 63 IN | BODY MASS INDEX: 25.87 KG/M2 | HEART RATE: 92 BPM

## 2025-05-22 DIAGNOSIS — R13.10 DYSPHAGIA, UNSPECIFIED TYPE: ICD-10-CM

## 2025-05-22 DIAGNOSIS — K21.9 GASTROESOPHAGEAL REFLUX DISEASE, UNSPECIFIED WHETHER ESOPHAGITIS PRESENT: ICD-10-CM

## 2025-05-22 DIAGNOSIS — Z86.0101 HISTORY OF ADENOMATOUS POLYP OF COLON: Primary | ICD-10-CM

## 2025-05-22 PROCEDURE — 99214 OFFICE O/P EST MOD 30 MIN: CPT | Performed by: INTERNAL MEDICINE

## 2025-05-22 PROCEDURE — 3008F BODY MASS INDEX DOCD: CPT | Performed by: INTERNAL MEDICINE

## 2025-05-22 PROCEDURE — 1036F TOBACCO NON-USER: CPT | Performed by: INTERNAL MEDICINE

## 2025-05-22 RX ORDER — OMEPRAZOLE 20 MG/1
20 CAPSULE, DELAYED RELEASE ORAL DAILY
Qty: 90 CAPSULE | Refills: 3 | Status: SHIPPED | OUTPATIENT
Start: 2025-05-22

## 2025-05-22 ASSESSMENT — ENCOUNTER SYMPTOMS: SHORTNESS OF BREATH: 0

## 2025-05-22 NOTE — PROGRESS NOTES
REASON FOR VISIT:  GERD  PCP (requesting provider): Nara Villeda DO.    HPI:  Linda Montenegro is a 39 y.o. female with a past medical history of HTN and SVT being evaluated for GERD and personal history of adenomatous colon polyps.    The patient reports bowel habits at baseline. She has been struggling with acid reflux. She notes having a baby in 2022. She had a lot of reflux with that pregnancy and took TUMS. She was then ok for 1 year. She has since noticed a lot of burping. She then noticed dysphagia on a daily basis for months. This is occurred with solids but occasionally liquids. She started Omeprazole 20 mg daily and symptoms improved after a few weeks. She has been on Omeprazole since September. She has cut out coffee and peanut butter. No regurgitation. No asthma or eczema. No prior EGD. She avoids NSAIDs. She takes Tylenol. She had typical heartburn symptoms.     PSurgHx:  -Diagnostic laparoscopy     FamHx: MGF with rectal cancer (age 70s). Mother heterozygous for MUTYH associated polyposis.    Prior Endoscopy:  -Colonoscopy (1/2021): Excellent prep, normal TI, 6 mm cecal polyp (TA), 5 mm sigmoid polyp (HP), otherwise normal colon.    PAST MEDICAL HISTORY  Medical History[1]    PAST SURGICAL HISTORY  Surgical History[2]    FAMILY HISTORY  Family History[3]    SOCIAL HISTORY   reports that she has never smoked. She has never used smokeless tobacco. She reports current alcohol use of about 2.0 - 4.0 standard drinks of alcohol per week. She reports that she does not use drugs.    REVIEW OF SYSTEMS  Review of Systems   Respiratory:  Negative for shortness of breath.    Cardiovascular:  Negative for chest pain.   All other systems reviewed and are negative.    A 10+ point review of systems was otherwise negative except as noted and per HPI.    ALLERGIES  Allergies[4]    MEDICATIONS  Current Outpatient Medications   Medication Instructions    busPIRone (BUSPAR) 5 mg, oral, 2 times daily PRN     cholecalciferol (VITAMIN D3) 2,000 Units, Daily    losartan (COZAAR) 25 mg, oral, Daily    NON FORMULARY Vitamin b12 100mg   1 tab daily    omeprazole (PRILOSEC) 20 mg       VITALS  Vitals:    05/22/25 1422   Pulse: 92      Body mass index is 25.86 kg/m².    PHYSICAL EXAM  CONSTITUTIONAL: NAD, appears stated age  EYES: anicteric sclera, sclera clear  HEAD: normocephalic, atraumatic   NECK: supple   PULMONARY: CTAB  CARDIOVASCULAR: RRR, no M/R/G appreciated   ABDOMEN: soft, NTND, +BS, no rebound or guarding   MUSCULOSKELETAL: no edema  SKIN: no jaundice   PSYCHIATRIC: AOx3, appropriate insight and judgement    LABS  WBC   Date Value   11/22/2023 6.2 x10*3/uL   12/03/2019 5.9 x10E9/L   11/26/2019 12.3 x10E9/L (H)   06/27/2019 5.0 x10E9/L     WHITE BLOOD CELL COUNT (Thousand/uL)   Date Value   03/13/2025 6.4     Hemoglobin (g/dL)   Date Value   11/22/2023 14.6   12/03/2019 15.0   11/26/2019 14.4   06/27/2019 14.5     HEMOGLOBIN (g/dL)   Date Value   03/13/2025 14.9     Platelets   Date Value   11/22/2023 226 x10*3/uL   12/03/2019 243 x10E9/L   11/26/2019 240 x10E9/L   06/27/2019 227 x10E9/L     PLATELET COUNT (Thousand/uL)   Date Value   03/13/2025 254     SODIUM (mmol/L)   Date Value   03/13/2025 138     Sodium (mmol/L)   Date Value   11/22/2023 137   12/03/2019 138   11/27/2019 137   11/26/2019 138     POTASSIUM (mmol/L)   Date Value   03/13/2025 4.0     Potassium (mmol/L)   Date Value   11/22/2023 4.2   12/03/2019 4.0   11/27/2019 3.4 (L)   11/26/2019 3.9     CHLORIDE (mmol/L)   Date Value   03/13/2025 102     Chloride (mmol/L)   Date Value   11/22/2023 104   12/03/2019 103   11/27/2019 104   11/26/2019 106     CARBON DIOXIDE (mmol/L)   Date Value   03/13/2025 26     Bicarbonate (mmol/L)   Date Value   11/22/2023 27   12/03/2019 27   11/27/2019 22   11/26/2019 24     UREA NITROGEN (BUN) (mg/dL)   Date Value   03/13/2025 18     Urea Nitrogen (mg/dL)   Date Value   11/22/2023 16   12/03/2019 14   11/27/2019 8  "  11/26/2019 12     Creatinine (mg/dL)   Date Value   11/22/2023 0.69   12/03/2019 0.68   11/27/2019 0.65   11/26/2019 0.70     CREATININE (mg/dL)   Date Value   03/13/2025 0.76     CALCIUM (mg/dL)   Date Value   03/13/2025 9.3     Calcium (mg/dL)   Date Value   11/22/2023 9.2   12/03/2019 9.6   11/27/2019 8.6   11/26/2019 8.6     PROTEIN, TOTAL (g/dL)   Date Value   03/13/2025 7.3     Total Protein (g/dL)   Date Value   11/22/2023 6.9   11/26/2019 7.1   06/27/2019 7.1   04/19/2018 7.3     BILIRUBIN, TOTAL (mg/dL)   Date Value   03/13/2025 0.7     Bilirubin, Total (mg/dL)   Date Value   11/22/2023 0.4     Total Bilirubin (mg/dL)   Date Value   11/26/2019 0.4   06/27/2019 0.7   04/19/2018 0.6     ALKALINE PHOSPHATASE (U/L)   Date Value   03/13/2025 48     Alkaline Phosphatase (U/L)   Date Value   11/22/2023 94   11/26/2019 53   06/27/2019 55   04/19/2018 51     ALT (U/L)   Date Value   03/13/2025 15   11/22/2023 16     ALT (SGPT) (U/L)   Date Value   11/26/2019 20   06/27/2019 13   04/19/2018 14     AST (U/L)   Date Value   03/13/2025 19   11/22/2023 17   11/26/2019 22   06/27/2019 17   04/19/2018 17     GLUCOSE (mg/dL)   Date Value   03/13/2025 88     Glucose (mg/dL)   Date Value   11/22/2023 87   12/03/2019 91   11/27/2019 149 (H)   11/26/2019 116 (H)     No results found for: \"LIPASE\", \"CRP\"    ASSESSMENT/PLAN  Linda Montenegro is a 39 y.o. female with a past medical history of HTN and SVT being evaluated for GERD and personal history of adenomatous colon polyps. Patient with worsening GERD symptoms as well as dysphagia that has since significantly improved on Omeprazole. We will continue on low dose Omeprazole. Discussed EGD to evaluate for esophagitis, hiatal hernia, or peptic ring/stricture.     -Plan for EGD (patient may try to coordinate scheduling this at same time as her colonoscopy)  -Due for surveillance colonoscopy in 1/2026 and patient will scheduled at her convenience   -The procedure(s) including " risks/benefits, diet restrictions, prep, and sedation were discussed with the patient  -Continue Omeprazole 20 mg daily  -Discussed GERD diet and lifestyle modifications     Follow-up will be at the time of the endoscopies.     Signature: Ha Sanders MD       [1]   Past Medical History:  Diagnosis Date    Encounter for  screening for Streptococcus B 10/16/2014    Screening, , for Streptococcus B    Irregular menstruation, unspecified 04/10/2014    Irregular menstrual cycle    Nonpurulent mastitis associated with the puerperium 2015    Mastitis, postpartum    Other conditions influencing health status 2014    History of pregnancy   [2]   Past Surgical History:  Procedure Laterality Date    LAPAROSCOPY DIAGNOSTIC / BIOPSY / ASPIRATION / LYSIS  04/10/2014    Laparoscopy (Diagnostic)   [3]   Family History  Problem Relation Name Age of Onset    Heart disease Father      Coronary artery disease Maternal Grandmother      Breast cancer Maternal Grandmother  50    Diabetes Paternal Grandmother      Hypertension Paternal Grandmother      Colon cancer Paternal Grandmother     [4] No Known Allergies

## 2025-06-19 ENCOUNTER — APPOINTMENT (OUTPATIENT)
Dept: PRIMARY CARE | Facility: CLINIC | Age: 40
End: 2025-06-19
Payer: COMMERCIAL

## 2025-06-19 VITALS
BODY MASS INDEX: 26.57 KG/M2 | TEMPERATURE: 97.4 F | DIASTOLIC BLOOD PRESSURE: 68 MMHG | HEART RATE: 72 BPM | OXYGEN SATURATION: 100 % | WEIGHT: 150 LBS | SYSTOLIC BLOOD PRESSURE: 112 MMHG

## 2025-06-19 DIAGNOSIS — M99.03 SOMATIC DYSFUNCTION OF SPINE, LUMBAR: ICD-10-CM

## 2025-06-19 DIAGNOSIS — M54.2 NECK PAIN: ICD-10-CM

## 2025-06-19 DIAGNOSIS — M99.06 SOMATIC DYSFUNCTION OF BOTH LOWER EXTREMITIES: ICD-10-CM

## 2025-06-19 DIAGNOSIS — M54.6 CHRONIC LEFT-SIDED THORACIC BACK PAIN: Primary | ICD-10-CM

## 2025-06-19 DIAGNOSIS — M99.05 SOMATIC DYSFUNCTION OF PELVIC REGION: ICD-10-CM

## 2025-06-19 DIAGNOSIS — G89.29 CHRONIC LEFT-SIDED THORACIC BACK PAIN: Primary | ICD-10-CM

## 2025-06-19 DIAGNOSIS — M99.01 SOMATIC DYSFUNCTION OF SPINE, CERVICAL: ICD-10-CM

## 2025-06-19 DIAGNOSIS — M99.02 SOMATIC DYSFUNCTION OF SPINE, THORACIC: ICD-10-CM

## 2025-06-19 DIAGNOSIS — M99.08 SOMATIC DYSFUNCTION OF RIB: ICD-10-CM

## 2025-06-19 DIAGNOSIS — M99.07 UPPER EXTREMITY SOMATIC DYSFUNCTION: ICD-10-CM

## 2025-06-19 PROCEDURE — 98928 OSTEOPATH MANJ 7-8 REGIONS: CPT | Performed by: FAMILY MEDICINE

## 2025-06-19 PROCEDURE — 99213 OFFICE O/P EST LOW 20 MIN: CPT | Performed by: FAMILY MEDICINE

## 2025-06-19 ASSESSMENT — ENCOUNTER SYMPTOMS
COUGH: 0
BACK PAIN: 1
NECK PAIN: 1

## 2025-06-19 NOTE — PROGRESS NOTES
Subjective   Patient ID: Linda Montenegro is a 39 y.o. female who presents for Back Pain (Recheck//Pt presents for OMT).    Linda continues to experience intermittent neck and back pain. Does get improvement after manipulation. Still occasionally having pain in low back, especially when first waking up. No leg weakness.          Review of Systems   HENT:  Negative for congestion.    Respiratory:  Negative for cough.    Musculoskeletal:  Positive for back pain and neck pain.       Objective   /68   Pulse 72   Temp 36.3 °C (97.4 °F)   Wt 68 kg (150 lb)   SpO2 100%   BMI 26.57 kg/m²     Physical Exam  Constitutional:       General: She is not in acute distress.     Appearance: Normal appearance.   HENT:      Head: Normocephalic and atraumatic.   Eyes:      Extraocular Movements: Extraocular movements intact.   Pulmonary:      Effort: Pulmonary effort is normal.   Musculoskeletal:      Cervical back: Neck supple.   Skin:     General: Skin is warm and dry.   Neurological:      Mental Status: She is alert.      Motor: No weakness.      Coordination: Coordination normal.      Gait: Gait normal.   Psychiatric:         Mood and Affect: Mood normal.       Osteopathic exam:  - Cervical: C4 ERrSl  - Thoracic: T4 FRlSl  - Ribs: left first rib inhalation dysfunction  - Upper extremity: bilateral subscapularis and rhomboid restriction, worse on left  - Lumbar: L3 FRlSl  - Pelvis: right posterior innominate  - Lower extremities: tissue texture change at left IT band      Assessment/Plan   Diagnoses and all orders for this visit:  Chronic left-sided thoracic back pain  Comments:  Continue stretches. Focus on strengthening gluteus medius muscles.  Neck pain  Comments:  Continue stretching.  Somatic dysfunction of spine, cervical  Somatic dysfunction of spine, thoracic  Somatic dysfunction of rib  Upper extremity somatic dysfunction  Somatic dysfunction of pelvic region  Somatic dysfunction of spine, lumbar  Somatic  dysfunction of both lower extremities  Other orders  -     Follow Up In Primary Care  -     Follow Up In Primary Care; Future          Somatic dysfunction treated with muscle energy, myofascial release and Still technique. Patient tolerated treatment well and reported improved range of motion after treatment.    Recommend drinks fluids. Follow-up in 4-6 weeks for further treatment.

## 2025-07-31 ENCOUNTER — APPOINTMENT (OUTPATIENT)
Dept: PRIMARY CARE | Facility: CLINIC | Age: 40
End: 2025-07-31
Payer: COMMERCIAL

## 2025-07-31 VITALS
SYSTOLIC BLOOD PRESSURE: 114 MMHG | WEIGHT: 151 LBS | DIASTOLIC BLOOD PRESSURE: 68 MMHG | TEMPERATURE: 98.1 F | BODY MASS INDEX: 26.75 KG/M2 | HEART RATE: 76 BPM | OXYGEN SATURATION: 98 %

## 2025-07-31 DIAGNOSIS — M54.6 CHRONIC LEFT-SIDED THORACIC BACK PAIN: ICD-10-CM

## 2025-07-31 DIAGNOSIS — M99.02 SOMATIC DYSFUNCTION OF SPINE, THORACIC: ICD-10-CM

## 2025-07-31 DIAGNOSIS — M99.06 SOMATIC DYSFUNCTION OF BOTH LOWER EXTREMITIES: ICD-10-CM

## 2025-07-31 DIAGNOSIS — M99.01 SOMATIC DYSFUNCTION OF SPINE, CERVICAL: ICD-10-CM

## 2025-07-31 DIAGNOSIS — M54.2 NECK PAIN: Primary | ICD-10-CM

## 2025-07-31 DIAGNOSIS — M99.05 SOMATIC DYSFUNCTION OF PELVIC REGION: ICD-10-CM

## 2025-07-31 DIAGNOSIS — M99.03 SOMATIC DYSFUNCTION OF SPINE, LUMBAR: ICD-10-CM

## 2025-07-31 DIAGNOSIS — G89.29 CHRONIC LEFT-SIDED THORACIC BACK PAIN: ICD-10-CM

## 2025-07-31 DIAGNOSIS — M99.04 SOMATIC DYSFUNCTION OF SPINE, SACRAL: ICD-10-CM

## 2025-07-31 DIAGNOSIS — M99.08 SOMATIC DYSFUNCTION OF RIB: ICD-10-CM

## 2025-07-31 PROCEDURE — 98928 OSTEOPATH MANJ 7-8 REGIONS: CPT | Performed by: FAMILY MEDICINE

## 2025-07-31 PROCEDURE — 99213 OFFICE O/P EST LOW 20 MIN: CPT | Performed by: FAMILY MEDICINE

## 2025-07-31 ASSESSMENT — ENCOUNTER SYMPTOMS
NECK PAIN: 1
BACK PAIN: 1
COUGH: 0

## 2025-07-31 NOTE — PROGRESS NOTES
Subjective   Patient ID: Linda Montenegro is a 39 y.o. female who presents for Back Pain (Recheck. Pt presents for OMT).    Linda has been having a flare of her low back pain. Recently was on vacation and had to drive to and from location. Also sitting at desk at work has aggravated her pain. She has not been as active lately which is contributing to her pain.          Review of Systems   HENT:  Negative for congestion.    Respiratory:  Negative for cough.    Musculoskeletal:  Positive for back pain and neck pain.       Objective   /68   Pulse 76   Temp 36.7 °C (98.1 °F)   Wt 68.5 kg (151 lb)   SpO2 98%   BMI 26.75 kg/m²     Physical Exam  Constitutional:       General: She is not in acute distress.     Appearance: Normal appearance.   HENT:      Head: Normocephalic and atraumatic.     Eyes:      Extraocular Movements: Extraocular movements intact.     Pulmonary:      Effort: Pulmonary effort is normal.     Musculoskeletal:      Cervical back: Neck supple.     Skin:     General: Skin is warm and dry.     Neurological:      Mental Status: She is alert.      Motor: No weakness.      Coordination: Coordination normal.      Gait: Gait normal.     Psychiatric:         Mood and Affect: Mood normal.       Osteopathic exam:  - Cervical: C3 ERrSl  - Thoracic: T5-7 FRlSl  - Ribs: left first rib inhalation dysfunction  - Upper extremity: bilateral subscapularis and rhomboid restriction, worse on left  - Lumbar: left QL trigger point  - Pelvis: right posterior innominate; left gluteus medius trigger point  - Lower extremities: tissue texture change at left IT band      Assessment/Plan   Diagnoses and all orders for this visit:  Neck pain  Chronic left-sided thoracic back pain  Somatic dysfunction of spine, cervical  Somatic dysfunction of spine, thoracic  Somatic dysfunction of rib  Somatic dysfunction of pelvic region  Somatic dysfunction of spine, lumbar  Somatic dysfunction of spine, sacral  Somatic  dysfunction of both lower extremities  Other orders  -     Follow Up In Primary Care  -     Follow Up In Primary Care; Future      Somatic dysfunction treated with muscle energy, myofascial release and Still technique. Patient tolerated treatment well and reported improved range of motion after treatment.    Recommend drinks fluids. Follow-up in 4-6 weeks for further treatment.

## 2025-08-06 ENCOUNTER — PATIENT MESSAGE (OUTPATIENT)
Dept: PRIMARY CARE | Facility: CLINIC | Age: 40
End: 2025-08-06
Payer: COMMERCIAL

## 2025-08-06 DIAGNOSIS — Z12.31 ENCOUNTER FOR SCREENING MAMMOGRAM FOR MALIGNANT NEOPLASM OF BREAST: Primary | ICD-10-CM

## 2025-09-11 ENCOUNTER — APPOINTMENT (OUTPATIENT)
Dept: PRIMARY CARE | Facility: CLINIC | Age: 40
End: 2025-09-11
Payer: COMMERCIAL